# Patient Record
Sex: FEMALE | Race: WHITE | NOT HISPANIC OR LATINO | Employment: FULL TIME
[De-identification: names, ages, dates, MRNs, and addresses within clinical notes are randomized per-mention and may not be internally consistent; named-entity substitution may affect disease eponyms.]

---

## 2022-06-05 ENCOUNTER — HEALTH MAINTENANCE LETTER (OUTPATIENT)
Age: 27
End: 2022-06-05

## 2022-10-15 ENCOUNTER — HEALTH MAINTENANCE LETTER (OUTPATIENT)
Age: 27
End: 2022-10-15

## 2023-02-08 ENCOUNTER — OFFICE VISIT (OUTPATIENT)
Dept: FAMILY MEDICINE | Facility: CLINIC | Age: 28
End: 2023-02-08
Payer: COMMERCIAL

## 2023-02-08 VITALS
OXYGEN SATURATION: 97 % | SYSTOLIC BLOOD PRESSURE: 125 MMHG | TEMPERATURE: 98.3 F | HEIGHT: 67 IN | DIASTOLIC BLOOD PRESSURE: 85 MMHG | HEART RATE: 103 BPM | WEIGHT: 249.8 LBS | BODY MASS INDEX: 39.21 KG/M2

## 2023-02-08 DIAGNOSIS — Z31.69 ENCOUNTER FOR PRECONCEPTION CONSULTATION: ICD-10-CM

## 2023-02-08 DIAGNOSIS — Z00.00 ENCOUNTER FOR HEALTH MAINTENANCE EXAMINATION IN ADULT: Primary | ICD-10-CM

## 2023-02-08 PROBLEM — F41.8 DEPRESSION WITH ANXIETY: Status: ACTIVE | Noted: 2021-10-04

## 2023-02-08 LAB
ALBUMIN UR-MCNC: NEGATIVE MG/DL
APPEARANCE UR: CLEAR
BILIRUB UR QL STRIP: NEGATIVE
COLOR UR AUTO: YELLOW
GLUCOSE UR STRIP-MCNC: NEGATIVE MG/DL
HGB UR QL STRIP: NEGATIVE
KETONES UR STRIP-MCNC: NEGATIVE MG/DL
LEUKOCYTE ESTERASE UR QL STRIP: NEGATIVE
NITRATE UR QL: NEGATIVE
PH UR STRIP: 7 [PH] (ref 5–8)
SP GR UR STRIP: 1.01 (ref 1–1.03)
UROBILINOGEN UR STRIP-ACNC: 0.2 E.U./DL

## 2023-02-08 PROCEDURE — G0145 SCR C/V CYTO,THINLAYER,RESCR: HCPCS | Performed by: STUDENT IN AN ORGANIZED HEALTH CARE EDUCATION/TRAINING PROGRAM

## 2023-02-08 PROCEDURE — 86762 RUBELLA ANTIBODY: CPT | Performed by: STUDENT IN AN ORGANIZED HEALTH CARE EDUCATION/TRAINING PROGRAM

## 2023-02-08 PROCEDURE — 86803 HEPATITIS C AB TEST: CPT | Performed by: STUDENT IN AN ORGANIZED HEALTH CARE EDUCATION/TRAINING PROGRAM

## 2023-02-08 PROCEDURE — 99385 PREV VISIT NEW AGE 18-39: CPT | Performed by: STUDENT IN AN ORGANIZED HEALTH CARE EDUCATION/TRAINING PROGRAM

## 2023-02-08 PROCEDURE — 81003 URINALYSIS AUTO W/O SCOPE: CPT | Performed by: STUDENT IN AN ORGANIZED HEALTH CARE EDUCATION/TRAINING PROGRAM

## 2023-02-08 PROCEDURE — 36415 COLL VENOUS BLD VENIPUNCTURE: CPT | Performed by: STUDENT IN AN ORGANIZED HEALTH CARE EDUCATION/TRAINING PROGRAM

## 2023-02-08 PROCEDURE — 86787 VARICELLA-ZOSTER ANTIBODY: CPT | Performed by: STUDENT IN AN ORGANIZED HEALTH CARE EDUCATION/TRAINING PROGRAM

## 2023-02-08 RX ORDER — NORETHINDRONE ACETATE AND ETHINYL ESTRADIOL 1MG-20(21)
1 KIT ORAL DAILY
COMMUNITY
End: 2023-10-26

## 2023-02-08 RX ORDER — NORETHINDRONE ACETATE AND ETHINYL ESTRADIOL 1MG-20(21)
1 KIT ORAL EVERY MORNING
COMMUNITY
Start: 2022-11-14 | End: 2023-02-08

## 2023-02-08 RX ORDER — NORETHINDRONE ACETATE AND ETHINYL ESTRADIOL 1MG-20(21)
1 KIT ORAL EVERY MORNING
Qty: 84 TABLET | Refills: 3 | Status: SHIPPED | OUTPATIENT
Start: 2023-02-08 | End: 2023-10-26

## 2023-02-08 RX ORDER — FLUTICASONE PROPIONATE 50 MCG
1 SPRAY, SUSPENSION (ML) NASAL DAILY
COMMUNITY

## 2023-02-08 RX ORDER — LORATADINE 10 MG/1
10 TABLET ORAL DAILY
COMMUNITY

## 2023-02-08 RX ORDER — FLUOXETINE 10 MG/1
30 CAPSULE ORAL
COMMUNITY
Start: 2021-09-21 | End: 2023-10-26

## 2023-02-08 ASSESSMENT — ENCOUNTER SYMPTOMS
NERVOUS/ANXIOUS: 1
COUGH: 0
WEAKNESS: 0
DYSURIA: 0
CONSTIPATION: 0
ARTHRALGIAS: 0
BREAST MASS: 0
CHILLS: 0
FEVER: 0
FREQUENCY: 0
PALPITATIONS: 0
NAUSEA: 0
HEADACHES: 0
HEMATURIA: 0
SHORTNESS OF BREATH: 0
EYE PAIN: 0
JOINT SWELLING: 0
DIZZINESS: 0
SORE THROAT: 0
ABDOMINAL PAIN: 0
DIARRHEA: 0
HEARTBURN: 0
PARESTHESIAS: 0
HEMATOCHEZIA: 0
MYALGIAS: 0

## 2023-02-08 ASSESSMENT — PATIENT HEALTH QUESTIONNAIRE - PHQ9
SUM OF ALL RESPONSES TO PHQ QUESTIONS 1-9: 16
SUM OF ALL RESPONSES TO PHQ QUESTIONS 1-9: 16
10. IF YOU CHECKED OFF ANY PROBLEMS, HOW DIFFICULT HAVE THESE PROBLEMS MADE IT FOR YOU TO DO YOUR WORK, TAKE CARE OF THINGS AT HOME, OR GET ALONG WITH OTHER PEOPLE: SOMEWHAT DIFFICULT

## 2023-02-08 NOTE — PROGRESS NOTES
SUBJECTIVE:   CC: Christi is an 27 year old who presents for preventive health visit.       Healthy Habits:     Getting at least 3 servings of Calcium per day:  Yes    Bi-annual eye exam:  NO    Dental care twice a year:  Yes    Sleep apnea or symptoms of sleep apnea:  Daytime drowsiness and Sleep apnea    Diet:  Regular (no restrictions)    Frequency of exercise:  2-3 days/week    Duration of exercise:  Greater than 60 minutes    Taking medications regularly:  No    Barriers to taking medications:  Problems remembering to take them    Medication side effects:  None    PHQ-2 Total Score: 4    Additional concerns today:  Yes  UTI  Pertinent negatives include no abdominal pain, arthralgias, chest pain, chills, congestion, coughing, fever, headaches, joint swelling, myalgias, nausea, rash, sore throat or weakness.     ETOH use - was binging 2w ago, cutting back     ++++++++++++++++++++++++  Exercise: doesn't do anything strenuous or weight. Walks 120 minutes/week.     Food: mainly eats at home, did previously eat a lot of packaged foods but since Wilmer been working on diet - a lot more vegetables, a lot less processed foods     Today's PHQ-2 Score:   PHQ-2 ( 1999 [employer]) 2/8/2023   Q1: Little interest or pleasure in doing things 2   Q2: Feeling down, depressed or hopeless 2   PHQ-2 Score 4   Q1: Little interest or pleasure in doing things More than half the days   Q2: Feeling down, depressed or hopeless More than half the days   PHQ-2 Score 4         Social History     Tobacco Use     Smoking status: Never     Smokeless tobacco: Never   Substance Use Topics     Alcohol use: Yes     If you drink alcohol do you typically have >3 drinks per day or >7 drinks per week? No    Alcohol Use 2/8/2023   Prescreen: >3 drinks/day or >7 drinks/week? No   No flowsheet data found.    Reviewed orders with patient.  Reviewed health maintenance and updated orders accordingly - Yes  Lab work is in process    Breast Cancer  "Screening:    FHS-7: No flowsheet data found.  click delete button to remove this line now  Patient under 40 years of age: Routine Mammogram Screening not recommended.   Pertinent mammograms are reviewed under the imaging tab.    Repro hx:   Has been on OCPs past 3 years. Skips some placebo weeks -- maybe gets it twice a year   Periods before that were relatively mild. Cramps were bad, bleeding wasn't super heavy. Would get monthly periods.     History of abnormal Pap smear: NO - age 21-29 PAP every 3 years recommended  Last pap June 2019      Reviewed and updated as needed this visit by clinical staff    Allergies  Meds  Problems  Med Hx  Surg Hx           Reviewed and updated as needed this visit by Provider    Allergies  Meds  Problems  Med Hx  Surg Hx              Review of Systems   Constitutional: Negative for chills and fever.   HENT: Negative for congestion, ear pain, hearing loss and sore throat.    Eyes: Negative for pain and visual disturbance.   Respiratory: Negative for cough and shortness of breath.    Cardiovascular: Negative for chest pain, palpitations and peripheral edema.   Gastrointestinal: Negative for abdominal pain, constipation, diarrhea, heartburn, hematochezia and nausea.   Breasts:  Negative for tenderness, breast mass and discharge.   Genitourinary: Negative for dysuria, frequency, genital sores, hematuria, pelvic pain, urgency, vaginal bleeding and vaginal discharge.   Musculoskeletal: Negative for arthralgias, joint swelling and myalgias.   Skin: Negative for rash.   Neurological: Negative for dizziness, weakness, headaches and paresthesias.   Psychiatric/Behavioral: Negative for mood changes. The patient is nervous/anxious.           OBJECTIVE:   /85   Pulse 103   Temp 98.3  F (36.8  C) (Oral)   Ht 1.714 m (5' 7.48\")   Wt 113.3 kg (249 lb 12.8 oz)   LMP 01/25/2023 (Exact Date)   SpO2 97%   BMI 38.57 kg/m    Physical Exam  GENERAL: healthy, alert and no " "distress  EYES: Eyes grossly normal to inspection, PERRL and conjunctivae and sclerae normal  HENT: ear canals and TM's normal, nose and mouth without ulcers or lesions  NECK: no adenopathy, no asymmetry, masses, or scars and thyroid normal to palpation  RESP: lungs clear to auscultation - no rales, rhonchi or wheezes  CV: regular rate and rhythm, normal S1 S2, no S3 or S4, no murmur, click or rub   MS: no gross musculoskeletal defects noted, no edema  SKIN: no suspicious lesions or rashes  NEURO: Normal strength and tone, mentation intact and speech normal  PSYCH: mentation appears normal, affect normal/bright       ASSESSMENT/PLAN:   Christi was seen today for physical and uti.    Diagnoses and all orders for this visit:    Encounter for health maintenance examination in adult  Encounter for preconception consultation  Pap updated, OCPs refilled, commended on healthy lifestyle changes. Will complete preconception infection screening as a part of routine labs. Discussed conception generally, folic acid supplement once off OCPs, RTC as needed with questions.   -     norethindrone-ethinyl estradiol (JUNEL FE 1/20) 1-20 MG-MCG tablet; Take 1 tablet by mouth every morning  -     UA reflex to Microscopic and Culture; Future  -     Rubella Antibody IgG; Future  -     Varicella Zoster Virus Antibody IgG; Future  -     Hepatitis C Screen Reflex to HCV RNA Quant and Genotype; Future  -     Pap screen reflex to HPV if ASCUS - recommend age 25 - 29  -     Rubella Antibody IgG  -     Varicella Zoster Virus Antibody IgG  -     Hepatitis C Screen Reflex to HCV RNA Quant and Genotype  -     UA reflex to Microscopic and Culture  -     Cancel: HPV Hold (Lab Only)                COUNSELING:  Reviewed preventive health counseling, as reflected in patient instructions      BMI:   Estimated body mass index is 38.57 kg/m  as calculated from the following:    Height as of this encounter: 1.714 m (5' 7.48\").    Weight as of this encounter: " 113.3 kg (249 lb 12.8 oz).   Weight management plan: Discussed healthy diet and exercise guidelines      She reports that she has never smoked. She has never used smokeless tobacco.    Edith Ferrera DO  [facility]  Answers for HPI/ROS submitted by the patient on 2/8/2023  If you checked off any problems, how difficult have these problems made it for you to do your work, take care of things at home, or get along with other people?: Somewhat difficult  PHQ9 TOTAL SCORE: 16

## 2023-02-09 LAB
HCV AB SERPL QL IA: NONREACTIVE
RUBV IGG SERPL QL IA: 1.77 INDEX
RUBV IGG SERPL QL IA: POSITIVE
VZV IGG SER QL IA: 36.9 INDEX
VZV IGG SER QL IA: NORMAL

## 2023-02-13 LAB
BKR LAB AP GYN ADEQUACY: NORMAL
BKR LAB AP GYN INTERPRETATION: NORMAL
BKR LAB AP HPV REFLEX: NORMAL
BKR LAB AP LMP: NORMAL
BKR LAB AP PREVIOUS ABNORMAL: NORMAL
PATH REPORT.COMMENTS IMP SPEC: NORMAL
PATH REPORT.COMMENTS IMP SPEC: NORMAL
PATH REPORT.RELEVANT HX SPEC: NORMAL

## 2023-03-21 ENCOUNTER — ALLIED HEALTH/NURSE VISIT (OUTPATIENT)
Dept: FAMILY MEDICINE | Facility: CLINIC | Age: 28
End: 2023-03-21
Payer: COMMERCIAL

## 2023-03-21 DIAGNOSIS — Z23 ENCOUNTER FOR IMMUNIZATION: Primary | ICD-10-CM

## 2023-03-21 PROCEDURE — 90471 IMMUNIZATION ADMIN: CPT

## 2023-03-21 PROCEDURE — 90716 VAR VACCINE LIVE SUBQ: CPT

## 2023-03-21 PROCEDURE — 99207 PR NO CHARGE NURSE ONLY: CPT

## 2023-03-21 RX ORDER — TRAZODONE HYDROCHLORIDE 50 MG/1
TABLET, FILM COATED ORAL
COMMUNITY
Start: 2023-03-15 | End: 2023-10-26

## 2023-03-21 RX ORDER — HYDROXYZINE HYDROCHLORIDE 25 MG/1
TABLET, FILM COATED ORAL
COMMUNITY
Start: 2023-03-16 | End: 2024-07-01

## 2023-03-21 RX ORDER — FLUOXETINE 40 MG/1
CAPSULE ORAL
COMMUNITY
Start: 2023-02-22 | End: 2023-10-26

## 2023-03-21 NOTE — PROGRESS NOTES
Patient here for second varicella vaccine, first in 2007. Patient wanting to get pregnant this year so she wanted to be immunized. Titers were drawn per patient report and she is not immune.    Vaccine given in the right deltoid, patient tolderated well.    Martha Chao RN

## 2023-10-26 ENCOUNTER — VIRTUAL VISIT (OUTPATIENT)
Dept: OBGYN | Facility: CLINIC | Age: 28
End: 2023-10-26
Payer: COMMERCIAL

## 2023-10-26 VITALS — BODY MASS INDEX: 38.55 KG/M2 | HEIGHT: 68 IN

## 2023-10-26 DIAGNOSIS — Z34.00 ENCOUNTER FOR SUPERVISION OF NORMAL FIRST PREGNANCY: ICD-10-CM

## 2023-10-26 DIAGNOSIS — Z23 NEED FOR TDAP VACCINATION: Primary | ICD-10-CM

## 2023-10-26 PROCEDURE — 99207 PR NO CHARGE NURSE ONLY: CPT

## 2023-10-26 RX ORDER — VITAMIN B COMPLEX
TABLET ORAL DAILY
COMMUNITY

## 2023-10-26 RX ORDER — MULTIVIT-MIN/IRON/FOLIC ACID/K 18-600-40
CAPSULE ORAL
COMMUNITY
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
## 2023-11-08 ENCOUNTER — VIRTUAL VISIT (OUTPATIENT)
Dept: EDUCATION SERVICES | Facility: CLINIC | Age: 28
End: 2023-11-08
Attending: OBSTETRICS & GYNECOLOGY
Payer: COMMERCIAL

## 2023-11-08 DIAGNOSIS — Z34.00 ENCOUNTER FOR SUPERVISION OF NORMAL FIRST PREGNANCY: ICD-10-CM

## 2023-11-08 PROCEDURE — 97802 MEDICAL NUTRITION INDIV IN: CPT | Performed by: DIETITIAN, REGISTERED

## 2023-11-08 NOTE — LETTER
11/8/2023         RE: Christi Barrientos  [address]  [facility] [address]        Dear Colleague,    Thank you for referring your patient, Christi Barrientos, to the [facility] DIABETES EDUCATION. Please see a copy of my visit note below.    MEDICAL NUTRITION THERAPY  Visit Type:Initial assessment and intervention  Type of Service: Telephone Visit    Originating Location (Patient Location): Home  Distant Location (Provider Location): Offsite  Mode of Communication:  Telephone  Telephone Visit Start Time:  106  Telephone Visit End Time (telephone visit stop time): 126    SUBJECTIVE:   Christi Barrientos presents today for MNT and education related to healthy normal nutrition .   She is accompanied by self.   PATIENT STATED GOAL(S) FOR THIS VISIT: general healthy nutrition while pregnant     EATING HABITS:   Know that weight gain recommendations are around 11-20 # and is concerned for potential for more with cravings.  Wants to start off well and focus on balance and overall good nutrition.   Worried about binge eating and gaining weight.  Since finding out abotu being pregnant has been eating adequate calories for maintenance / normal nutrition; had previously been trying to lose weight and calories were lower at 1600.  Brougth it up to 2000 but still feels hungry; listening to body and not tracking that closely.  2000cals is below estimates and ok wto add more focusing on healhty sources ofr weight maintenance during first trimester.     Breakfast - toast and eggs.     Lunch: leftover / soup and sandwiches / aisan foods, rice / meat   Dinner - avoiding meat with being turned off by it.  Aisan foods, tiki marsala, stirfryes, hot and sour soup   Zero interest in meat since being pregnant,   Morning sickness around dinner   3 main meals   Snacks - craving fruit right now, pear and apple   Family / birthday season  - cakes / brothers brining treats.   Multigrain could try, likes white    Peanut butter  - not a big fan   Dairy is OK  - plain yogurt   Fairlife milk  for added protein   Eggs - OK   Veggies - no issues with those   Avocados - could try again, liked pre pregnancy   240#       EXERCISE: not assessed  SOCIO/ECONOMIC:   Lives with: self and spouse    OBJECTIVE:   Vitals: LMP 09/11/2023     Wt Readings from Last 5 Encounters:   02/08/23 113.3 kg (249 lb 12.8 oz)         ASSESSMENT:     VERBAL AND WRITTEN INFORMATION GIVEN TO SUPPORT:  Discussed: general nutrition guidelines, consistent meals, labeling, dining out/special occasions, fiber, portion control, and adequate macronutrients and balance of foods.    PLAN:   PATIENT'S BEHAVIOR CHANGE GOALS:   See Patient Instructions for patient stated behavior change goals. AVS was printed and given to patient at today's appointment.    FOLLOW UP:   Follow up with RD as needed.  Call RD with questions/concerns.     Julia Hawthorne RD, LD, [facility]  Diabetes Education    Time spent in minutes: 15mnt  Encounter: Individual

## 2023-11-08 NOTE — PROGRESS NOTES
MEDICAL NUTRITION THERAPY  Visit Type:Initial assessment and intervention  Type of Service: Telephone Visit    Originating Location (Patient Location): Home  Distant Location (Provider Location): Offsite  Mode of Communication:  Telephone  Telephone Visit Start Time:  106  Telephone Visit End Time (telephone visit stop time): 126    SUBJECTIVE:   Christi Barrientos presents today for MNT and education related to healthy normal nutrition .   She is accompanied by self.   PATIENT STATED GOAL(S) FOR THIS VISIT: general healthy nutrition while pregnant     EATING HABITS:   Know that weight gain recommendations are around 11-20 # and is concerned for potential for more with cravings.  Wants to start off well and focus on balance and overall good nutrition.   Worried about binge eating and gaining weight.  Since finding out abotu being pregnant has been eating adequate calories for maintenance / normal nutrition; had previously been trying to lose weight and calories were lower at 1600.  Brougth it up to 2000 but still feels hungry; listening to body and not tracking that closely.  2000cals is below estimates and ok wto add more focusing on healhty sources ofr weight maintenance during first trimester.     Breakfast - toast and eggs.     Lunch: leftover / soup and sandwiches / aisan foods, rice / meat   Dinner - avoiding meat with being turned off by it.  Aisan foods, tiki marsala, stirfryes, hot and sour soup   Zero interest in meat since being pregnant,   Morning sickness around dinner   3 main meals   Snacks - craving fruit right now, pear and apple   Family / birthday season  - cakes / brothers brining treats.   Multigrain could try, likes white   Peanut butter  - not a big fan   Dairy is OK  - plain yogurt   Fairlife milk  for added protein   Eggs - OK   Veggies - no issues with those   Avocados - could try again, liked pre pregnancy   240#       EXERCISE: not assessed  SOCIO/ECONOMIC:   Lives with: self and  spouse    OBJECTIVE:   Vitals: LMP 09/11/2023     Wt Readings from Last 5 Encounters:   02/08/23 113.3 kg (249 lb 12.8 oz)         ASSESSMENT:     VERBAL AND WRITTEN INFORMATION GIVEN TO SUPPORT:  Discussed: general nutrition guidelines, consistent meals, labeling, dining out/special occasions, fiber, portion control, and adequate macronutrients and balance of foods.    PLAN:   PATIENT'S BEHAVIOR CHANGE GOALS:   See Patient Instructions for patient stated behavior change goals. AVS was printed and given to patient at today's appointment.    FOLLOW UP:   Follow up with RD as needed.  Call RD with questions/concerns.     Julia Hawthorne RD, LD, [facility]  Diabetes Education    Time spent in minutes: 15mnt  Encounter: Individual

## 2023-11-26 LAB
ABO/RH(D): NORMAL
ANTIBODY SCREEN: NEGATIVE
SPECIMEN EXPIRATION DATE: NORMAL

## 2023-11-27 ENCOUNTER — ANCILLARY PROCEDURE (OUTPATIENT)
Dept: ULTRASOUND IMAGING | Facility: CLINIC | Age: 28
End: 2023-11-27
Payer: COMMERCIAL

## 2023-11-27 ENCOUNTER — TRANSCRIBE ORDERS (OUTPATIENT)
Dept: MATERNAL FETAL MEDICINE | Facility: CLINIC | Age: 28
End: 2023-11-27

## 2023-11-27 ENCOUNTER — PRENATAL OFFICE VISIT (OUTPATIENT)
Dept: OBGYN | Facility: CLINIC | Age: 28
End: 2023-11-27
Payer: COMMERCIAL

## 2023-11-27 VITALS
DIASTOLIC BLOOD PRESSURE: 75 MMHG | BODY MASS INDEX: 38.27 KG/M2 | OXYGEN SATURATION: 99 % | HEART RATE: 107 BPM | SYSTOLIC BLOOD PRESSURE: 114 MMHG | WEIGHT: 248 LBS

## 2023-11-27 DIAGNOSIS — E66.812 CLASS 2 OBESITY WITHOUT SERIOUS COMORBIDITY WITH BODY MASS INDEX (BMI) OF 38.0 TO 38.9 IN ADULT, UNSPECIFIED OBESITY TYPE: ICD-10-CM

## 2023-11-27 DIAGNOSIS — Z34.01 ENCOUNTER FOR SUPERVISION OF NORMAL FIRST PREGNANCY IN FIRST TRIMESTER: Primary | ICD-10-CM

## 2023-11-27 DIAGNOSIS — O26.90 PREGNANCY RELATED CONDITION, ANTEPARTUM: Primary | ICD-10-CM

## 2023-11-27 DIAGNOSIS — Z34.00 ENCOUNTER FOR SUPERVISION OF NORMAL FIRST PREGNANCY: ICD-10-CM

## 2023-11-27 DIAGNOSIS — Z88.0 PENICILLIN ALLERGY: ICD-10-CM

## 2023-11-27 LAB
ALBUMIN UR-MCNC: NEGATIVE MG/DL
APPEARANCE UR: CLEAR
BILIRUB UR QL STRIP: NEGATIVE
COLOR UR AUTO: YELLOW
ERYTHROCYTE [DISTWIDTH] IN BLOOD BY AUTOMATED COUNT: 14.5 % (ref 10–15)
GLUCOSE UR STRIP-MCNC: NEGATIVE MG/DL
HBA1C MFR BLD: 5.7 % (ref 0–5.6)
HCT VFR BLD AUTO: 38.8 % (ref 35–47)
HGB BLD-MCNC: 12.7 G/DL (ref 11.7–15.7)
HGB UR QL STRIP: NEGATIVE
KETONES UR STRIP-MCNC: NEGATIVE MG/DL
LEUKOCYTE ESTERASE UR QL STRIP: ABNORMAL
MCH RBC QN AUTO: 26.3 PG (ref 26.5–33)
MCHC RBC AUTO-ENTMCNC: 32.7 G/DL (ref 31.5–36.5)
MCV RBC AUTO: 81 FL (ref 78–100)
NITRATE UR QL: NEGATIVE
PH UR STRIP: 5.5 [PH] (ref 5–7)
PLATELET # BLD AUTO: 361 10E3/UL (ref 150–450)
RBC # BLD AUTO: 4.82 10E6/UL (ref 3.8–5.2)
SP GR UR STRIP: <=1.005 (ref 1–1.03)
UROBILINOGEN UR STRIP-ACNC: 0.2 E.U./DL
WBC # BLD AUTO: 12.9 10E3/UL (ref 4–11)

## 2023-11-27 PROCEDURE — 81003 URINALYSIS AUTO W/O SCOPE: CPT | Performed by: OBSTETRICS & GYNECOLOGY

## 2023-11-27 PROCEDURE — 86901 BLOOD TYPING SEROLOGIC RH(D): CPT | Performed by: OBSTETRICS & GYNECOLOGY

## 2023-11-27 PROCEDURE — 87389 HIV-1 AG W/HIV-1&-2 AB AG IA: CPT | Performed by: OBSTETRICS & GYNECOLOGY

## 2023-11-27 PROCEDURE — 86803 HEPATITIS C AB TEST: CPT | Performed by: OBSTETRICS & GYNECOLOGY

## 2023-11-27 PROCEDURE — 99203 OFFICE O/P NEW LOW 30 MIN: CPT | Mod: 25 | Performed by: OBSTETRICS & GYNECOLOGY

## 2023-11-27 PROCEDURE — 86850 RBC ANTIBODY SCREEN: CPT | Performed by: OBSTETRICS & GYNECOLOGY

## 2023-11-27 PROCEDURE — 36415 COLL VENOUS BLD VENIPUNCTURE: CPT | Performed by: OBSTETRICS & GYNECOLOGY

## 2023-11-27 PROCEDURE — 86900 BLOOD TYPING SEROLOGIC ABO: CPT | Performed by: OBSTETRICS & GYNECOLOGY

## 2023-11-27 PROCEDURE — 87086 URINE CULTURE/COLONY COUNT: CPT | Performed by: OBSTETRICS & GYNECOLOGY

## 2023-11-27 PROCEDURE — 86762 RUBELLA ANTIBODY: CPT | Performed by: OBSTETRICS & GYNECOLOGY

## 2023-11-27 PROCEDURE — 87340 HEPATITIS B SURFACE AG IA: CPT | Performed by: OBSTETRICS & GYNECOLOGY

## 2023-11-27 PROCEDURE — 86780 TREPONEMA PALLIDUM: CPT | Performed by: OBSTETRICS & GYNECOLOGY

## 2023-11-27 PROCEDURE — 83036 HEMOGLOBIN GLYCOSYLATED A1C: CPT | Performed by: OBSTETRICS & GYNECOLOGY

## 2023-11-27 PROCEDURE — 85027 COMPLETE CBC AUTOMATED: CPT | Performed by: OBSTETRICS & GYNECOLOGY

## 2023-11-27 PROCEDURE — 76801 OB US < 14 WKS SINGLE FETUS: CPT | Performed by: OBSTETRICS & GYNECOLOGY

## 2023-11-27 NOTE — PROGRESS NOTES
OB - New OB History and Physical    HPI: Christi Barrientos is a 28 year old  at 11w0d as dated by LMP c/w 11w US.   Estimated Date of Delivery: 2024.    This was a planned pregnancy.  FOB Moo involved and  supportive.    Started trying in .  Had been on birth control for about 3 years before then.      Since becoming pregnant, patient reports she's been feeling nauseated, but eating crackers in the morning helps.  No spotting or cramping.    Recently had nutrition consult per her request.  Had been trying to lose weight, but wanted to ensure doing all the right things for pregnancy.    Hx of penicillin allergy since childhood.  Unsure of reaction, but maybe a rash or hives?    Ultrasound: today showed single live IUP measuring 10w3d, consistent with LMP dating.    Obstetric history:     OB History    Para Term  AB Living   1 0 0 0 0 0   SAB IAB Ectopic Multiple Live Births   0 0 0 0 0      # Outcome Date GA Lbr Jose/2nd Weight Sex Delivery Anes PTL Lv   1 Current                Gynecologic History:   Menstrual Interval: regular, monthly  Patient's last menstrual period was 2023.   STI history: neg  Last Pap: 23  History of abnormal pap: neg    Allergy: Penicillins  Patient denies food, latex or environmental allergies.     Current Medications:  Current Outpatient Medications   Medication    Ascorbic Acid (VITAMIN C) 500 MG CAPS    fluticasone (FLONASE) 50 MCG/ACT nasal spray    hydrOXYzine (ATARAX) 25 MG tablet    loratadine (CLARITIN) 10 MG tablet    Prenatal Vit-DSS-Fe Cbn-FA (PRENATAL AD PO)    sertraline (ZOLOFT) 50 MG tablet    Vitamin D3 (CHOLECALCIFEROL) 25 mcg (1000 units) tablet     No current facility-administered medications for this visit.       Past Medical History:  No past medical history on file.    Past Surgical History:  Past Surgical History:   Procedure Laterality Date    TONSILLECTOMY ADULT  2020       Social History:  Patient lives with ,  cat.  Patient's relationship status is: .    Works in IT at [employer].   Denies current tobacco, alcohol or recreational drug use.   She feels safe in her relationship. Patient denies history of sexual, physical or mental abuse.     Family History:  Family History   Problem Relation Age of Onset    Leukemia Mother 41    Diabetes Type 2  Paternal Grandmother     Diabetes Type 2  Paternal Grandfather     No Known Problems Brother     No Known Problems Brother     No Known Problems Brother     No Known Problems Sister     Diabetes Type 2  Paternal Aunt     Diabetes Paternal Uncle     Diabetes Type 2  Paternal Uncle        Review of Systems  Gen:  no change in weight, no fever, no chills, no fatigue  CV: no palpitations, no chest pain, no hypertension, no syncope  Resp: no shortness of breath, no cough, no wheezing, no asthma  GI: + nausea, no vomiting, no diarrhea, no constipation, no bloating, no GERD  :  no vaginal discharge, no dysuria, no abnormal bleeding, no pelvic pain   Endo: no thyroid problems, no cold/heat intolerance, no acne, no hirsutism, no diabetes  Heme: no easy bruising or bleeding, no history of DVT/PE/CVA  Neuro: no headaches, no seizures, no strokes, no focal deficits      Physical Exam:  Vitals:    23 1149   BP: 114/75   Pulse: 107   SpO2: 99%   Weight: 112.5 kg (248 lb)     Body mass index is 38.27 kg/m .  Gen: alert, oriented, no distress,  pleasant, appears stated age, appropriately groomed  Neck: supple, trachea midline, no thyromegaly, no lymphadenopathy  HEENT: head normocephalic, atraumatic, normal oropharynx without erythema or exudates  CV: normal heart sounds, regular rate and rhythm, no murmurs  Resp: good inspiratory effort, lungs clear to ascultation bilaterally, no wheezes or rhonchi  Extr: warm, well perfused, nontender, no edema  Psych: affect bright, cooperative, responds appropriately      Assessment:  Christi Barrientos is a 28 year old  at 11w0d presenting to  establish prenatal care.    Problem List:   Obesity, BMI 38.  MFM for Level 2  Penicillin allergy:  allergy referral for testing      Plan:  Reviewed routine prenatal care. Discussed MD call schedule as well as role of residents and med students both in clinic and hospital.  She is okay with resident care  Pap: UTD   Diet, Nutrition and Exercise:  Continue PNVs. Continue normal exercise. Her prepregnancy BMI is 37.  According to the WHO guidelines, patient is given a goal of gaining approximately 11-20 pounds during the course of her pregnancy.    Immunizations: plan TdaP at 28 weeks  Fetal anomaly screening: discussed, interested in NIPT.  Referral placed.  Routine Prenatal Care: the patient will return to clinic in 4 weeks and prn    Camila Kim MD

## 2023-11-28 LAB
HBV SURFACE AG SERPL QL IA: NONREACTIVE
HCV AB SERPL QL IA: NONREACTIVE
HIV 1+2 AB+HIV1 P24 AG SERPL QL IA: NONREACTIVE
RUBV IGG SERPL QL IA: 2.08 INDEX
RUBV IGG SERPL QL IA: POSITIVE
T PALLIDUM AB SER QL: NONREACTIVE

## 2023-11-29 LAB — BACTERIA UR CULT: NORMAL

## 2023-12-01 ENCOUNTER — PRE VISIT (OUTPATIENT)
Dept: MATERNAL FETAL MEDICINE | Facility: CLINIC | Age: 28
End: 2023-12-01
Payer: COMMERCIAL

## 2023-12-07 ENCOUNTER — OFFICE VISIT (OUTPATIENT)
Dept: MATERNAL FETAL MEDICINE | Facility: CLINIC | Age: 28
End: 2023-12-07
Attending: STUDENT IN AN ORGANIZED HEALTH CARE EDUCATION/TRAINING PROGRAM
Payer: COMMERCIAL

## 2023-12-07 ENCOUNTER — LAB (OUTPATIENT)
Dept: LAB | Facility: CLINIC | Age: 28
End: 2023-12-07
Attending: STUDENT IN AN ORGANIZED HEALTH CARE EDUCATION/TRAINING PROGRAM
Payer: COMMERCIAL

## 2023-12-07 ENCOUNTER — HOSPITAL ENCOUNTER (OUTPATIENT)
Dept: ULTRASOUND IMAGING | Facility: CLINIC | Age: 28
Discharge: HOME | End: 2023-12-07
Attending: STUDENT IN AN ORGANIZED HEALTH CARE EDUCATION/TRAINING PROGRAM
Payer: COMMERCIAL

## 2023-12-07 ENCOUNTER — MEDICAL CORRESPONDENCE (OUTPATIENT)
Dept: HEALTH INFORMATION MANAGEMENT | Facility: CLINIC | Age: 28
End: 2023-12-07

## 2023-12-07 DIAGNOSIS — O26.90 PREGNANCY RELATED CONDITION, ANTEPARTUM: ICD-10-CM

## 2023-12-07 DIAGNOSIS — O26.90 PREGNANCY RELATED CONDITION, ANTEPARTUM: Primary | ICD-10-CM

## 2023-12-07 DIAGNOSIS — Z36.82 NUCHAL TRANSLUCENCY OF FETUS ON PRENATAL ULTRASOUND: Primary | ICD-10-CM

## 2023-12-07 DIAGNOSIS — Z31.5 ENCOUNTER FOR PROCREATIVE GENETIC COUNSELING AND TESTING: ICD-10-CM

## 2023-12-07 DIAGNOSIS — Z31.430 ENCOUNTER OF FEMALE FOR TESTING FOR GENETIC DISEASE CARRIER STATUS FOR PROCREATIVE MANAGEMENT: ICD-10-CM

## 2023-12-07 DIAGNOSIS — O26.90 PREGNANCY RELATED CONDITION: Primary | ICD-10-CM

## 2023-12-07 DIAGNOSIS — Z31.9 UNSPECIFIED PROCREATIVE MANAGEMENT: ICD-10-CM

## 2023-12-07 PROCEDURE — 36415 COLL VENOUS BLD VENIPUNCTURE: CPT

## 2023-12-07 PROCEDURE — 76813 OB US NUCHAL MEAS 1 GEST: CPT | Mod: 26 | Performed by: STUDENT IN AN ORGANIZED HEALTH CARE EDUCATION/TRAINING PROGRAM

## 2023-12-07 PROCEDURE — 96040 HC GENETIC COUNSELING, EACH 30 MINUTES: CPT

## 2023-12-07 PROCEDURE — 76813 OB US NUCHAL MEAS 1 GEST: CPT

## 2023-12-07 NOTE — NURSING NOTE
Patient reports no pain, no contractions, leaking of fluid, or bleeding.  SBAR given to DOMINICK CLARK, see their note in Epic.

## 2023-12-07 NOTE — PROGRESS NOTES
[facility]  Genetic Counseling Consult    Patient:  Christi Barrientos YOB: 1995   Date of Service:  12/07/23   MRN: [id number]    Christi was seen at the [facility] for genetic consultation. The indication for genetic counseling is desire to discuss options for genetic screening and diagnostics. The patient was accompanied to this visit by their , Moo.    The session was conducted in English.        IMPRESSION/ PLAN   1. Christi has not had genetic screening in this pregnancy but elected to have screening today.     2. During today's [facility] visit, Christi had a blood draw for expanded non-invasive prenatal screening (NIPS) through [facility]. The expanded NIPS screens for trisomy 21, 18, and 13 and 22q11.2 deletion syndrome. The patient opted to screen for sex chromosome aneuploidies, including reported fetal sex.  In accordance with current guidelines, other microdeletion syndromes and rare autosomal trisomies were not included, but reflex to include these conditions remains available with expanded NIPS if there is an indication later in the pregnancy. Results are expected in 5-10 days. The patient will be called with results and if they do not answer they requested a detailed message with results on their voicemail, including the predicted fetal sex information. Christi was informed that results, including fetal sex, will be available in [employer].    3. Christi and Moo elected to pursue expanded carrier screening through [facility]. Results are expected in 2-3 weeks. I will call Christi when both of their results are back. Christi provided verbal consent to leave results in her voicemail. Consent to communicate forms were signed today. Results will also be available in [employer].    4. Christi had a first trimester complete ultrasound today. Please see the ultrasound report for further details.     5. Further  "recommendations include a fetal anatomy level II ultrasound with MFM. The upcoming ultrasound has been scheduled for 01/15/2024.    PREGNANCY HISTORY   /Parity:     No bleeding, illnesses, or exposures of concern were shared at today's visit. Christi's pregnancy history is non-contributory.    CURRENT PREGNANCY   Current Age: 28 year old   Age at Delivery: 28 year old  JASWANT: 2024, by Last Menstrual Period                                   Gestational Age: 12w3d  This pregnancy is a single gestation.   This pregnancy was conceived spontaneously.    MEDICAL HISTORY   Christi s reported medical history is not expected to impact pregnancy management or risks to fetal development.       FAMILY HISTORY   A three-generation family history was obtained today and is scanned under the \"Media\" tab in Epic. The family history was reported by Christi and Moo.    The following significant findings were reported today:   Both Christi and Moo have histories of anxiety and depression. Moo also has a history of substance use disorder. Moo is currently 36.  Mental health conditions are thought to be inherited in a multifactorial fashion, meaning many factors are involved in the development of these conditions. These factors usually include both genetic and environmental aspects and a combination of these aspects lead to symptoms. Given that there is a genetic component, the couple's children may be at increased risk to develop a mental health condition and were encouraged to share this information with their pediatrician and have awareness for early signs and symptoms.    Christi's mother passed away from leukemia shortly after she was diagnosed at 41. There is no other cancer history in Christi's mother's family. Christi's paternal grandmother  from cancer in her 40s, Christi believes it was uterine or cervical cancer. Some of Christi's father's siblings have had cancer but Christi was unsure about details.  We briefly " discussed the family history of cancer. Cancer most often occurs by chance, however some families seem to develop cancer more frequently than expected. Everyone has a risk to develop cancer, but individuals may be at an increased risk to develop cancer based on their family history. We discussed that early onset cancers can be associated with inherited cancer predisposition syndromes. Genetic counseling is available for cancer syndromes. Cancer family history, even without genetic testing, can change cancer screening recommendations for family members and aid in insurance coverage for access to them as well. The most informative individuals to complete cancer genetic counseling and genetic testing are those with a personal history of cancer or those closely related to the affected individuals. If the family wants more information they can contact the [facility] Cancer Risk Management Program ([phone]).   Moo's mother has a history of colon polyps (10s). Moo's brother and sister also have histories of polyps (10s or fewer). One of Moo's mother's siblings has a history of diverticulitis. Moo's last colonoscopy was at 35-36 years of age and he did not have any polyps.  We reviewed that there are some hereditary cancer predisposition syndromes that cause a high polyp burden. We discussed that it is important for Moo and his relatives to continue receiving colonoscopies as directed by their providers. Additionally, if any diagnoses of cancer are made or if the family would like to discuss this history further, they can reach out to the [facility] Cancer Risk Management Program (phone number above).  Moo's maternal grandfather passed away from a heart attack in his 40s.  Cardiac concerns can be related to many factors. There are genetic conditions that cause cardiac complications that could lead to heart attacks. Without additional information relating to this family history, it is difficult to  provide an accurate risk assessment.   Moo has a paternal first cousin who has autism spectrum disorder.  Some forms of autism spectrum disorders can be associated with specific genetic conditions, approximately 15-30% of individuals who have autism will have an identifiable genetic cause for this history. However, most often these conditions are due to the combination of genes and environmental factors that can affect development. Since there is a genetic component to autism spectrum disorders, the recurrence risk for other family members is higher among first-degree relatives of the individual with an autism spectrum disorder (full siblings), and decreases with distance in relationship to other second-degree relatives.    Otherwise, the reported family history is unremarkable for multiple miscarriages, stillbirths, birth defects, intellectual disabilities, autism spectrum disorder, developmental delays, cancer diagnosed under 50, known genetic conditions, and consanguinity.     RISK ASSESSMENT FOR CHROMOSOME CONDITIONS   We explained that the risk for fetal chromosome abnormalities increases with maternal age. We discussed specific features of common chromosome abnormalities, including Down syndrome, trisomy 13, trisomy 18, and sex chromosome trisomies.    At age 28 at midtrimester, the risk to have a baby with Down syndrome is 1 in 855.  At age 28 at midtrimester, the risk to have a baby with any chromosome abnormality is 1 in 428.     We also discussed that current ACMG guidelines recommend that screening for 22q11.2 deletion syndrome be offered to all pregnant patients. 22q11.2 deletion syndrome has an estimated prevalence of 1 in 990 to 1 in 2148 (0.05-0.1%). Risk is not thought to increase with maternal age. Clinical features are variable but include congenital heart defects, cleft palate, developmental delays, immune system deficiencies, and hearing loss. Approximately 90% of cases are de geovany (a sporadic  new change in a pregnancy). Cell-free DNA screening for 22q11.2 deletion syndrome is available (expanded NIPS through [employer]). We discussed the limitations of cell-free DNA screening in detecting microdeletions and the possiblity of false positives and false negatives. The data on this condition is more limited, so there is not a positive or negative predictive value available for results interpretation.    Christi has not had genetic screening in this pregnancy but elected to have screening today.      GENETIC TESTING OPTIONS FOR CHROMOSOMAL CONDITIONS   Genetic testing during a pregnancy includes screening and diagnostic procedures.      Screening tests are non-invasive which means no risk to the pregnancy and includes ultrasounds and blood work. The benefits and limitations of screening were reviewed. Screening tests provide a risk assessment (chance) specific to the pregnancy for certain fetal chromosome abnormalities but cannot definitively diagnose or exclude a fetal chromosome abnormality. Follow-up genetic counseling and consideration of diagnostic testing is recommended with any abnormal screening result. Diagnostic testing during a pregnancy is more certain and can test for more conditions. However, the tests do have a risk of miscarriage that requires careful consideration. These tests can detect fetal chromosome abnormalities with greater than 99% certainty. Results can be compromised by maternal cell contamination or mosaicism and are limited by the resolution of current genetic testing technology.     There is no screening or diagnostic test that detects all forms of birth defects or intellectual disability.     We discussed the following screening options:   Non-invasive prenatal screening (NIPS)  Also called cell-free DNA screening because it detects chromosomes from the placenta in the pregnant person's blood  Can be done any time after 10 weeks gestation  Screens for trisomy 21, trisomy  18, trisomy 13, and sex chromosome aneuploidies  Expanded NIPS also allows for reflex to include other microdeletion conditions and rare autosomal trisomies if an indication would arise later in the pregnancy. The patient elected to pursue screening for 22q11.2 deletion syndrome.   Cannot screen for open neural tube defects, maternal serum AFP after 15 weeks is recommended    We discussed the following ultrasound options:  Nuchal translucency (NT) ultrasound  Ultrasound between 23k8g-33q2r that includes nuchal translucency measurement and nasal bone assessments  Nuchal translucency refers to the space at the back of the neck where fluid builds up. All babies at this stage have fluid and there is only concern if there is too much fluid  Nasal bone refers to the small bone in the nose. There is concern for conditions like Down syndrome if the bone cannot be seen at all  This ultrasound can be done as part of first trimester screening, at the same time as another screen (NIPS), at the same time as a CVS, or if the patients does not want genetic screening.  Markers on ultrasound detects about 70% of pregnancies with aneuploidy  Abnormalities on NT ultrasound can also increase the risk for a birth defect, like a heart defect    We discussed the following diagnostic options:   Chorionic villus sampling (CVS)  Invasive diagnostic procedure done between 10w0d and 13w6d  The procedure collects a small sample from the placenta for the purpose of chromosomal testing and/or other genetic testing  Diagnostic result; more than 99% sensitivity for fetal chromosome abnormalities  Cannot screen for open neural tube defects, maternal serum AFP after 15 weeks is recommended  Amniocentesis  Invasive diagnostic procedure done after 15 weeks gestation  The procedure collects a small sample of amniotic fluid for the purpose of chromosomal testing and/or other genetic testing  Diagnostic result; more than 99% sensitivity for fetal  chromosome abnormalities  Testing for AFP in the amniotic fluid can test for open neural tube defects    CARRIER SCREENING   Expanded carrier screening is available to screen for autosomal recessive conditions and X-linked conditions in a large list of genes. Autosomal recessive conditions happen when a mutation has been inherited from the egg and sperm and include conditions like cystic fibrosis, thalassemia, hearing loss, spinal muscular atrophy, and more. X-linked conditions happen when a mutation has been inherited from the egg and include conditions like fragile X syndrome. [place] screening was also reviewed. About [place]  Screening    The patient elected to pursue carrier screening today. We discussed the following:   Carrier screening does not test the pregnancy but gives a risk assessment for the pregnancy and future pregnancies to have the condition  If both partners are carriers of the same condition, there is a 1 in 4 (25%) chance for any pregnancies they have together to have the condition  There are different size panels or list of conditions for carrier screening. The patient elected for [facility] comprehensive panel of 558 genes including fragile X syndrome.  We discussed examples of various conditions expanded carrier screening screens for  Some conditions cause health problems for carriers  We discussed the Genetic Information Nondiscrimination Act (MONI) and important gaps in the protections it affords  Carrier screening does not test for all genetic and health conditions or risk factors  There are limitations to current technology and results may be updated at a later date  The results typically take 2-3 weeks. They will be available in EPIC and routed to the referring OB provider. The patient can view them in [email or web address] and the lab's patient portal.  The patient's partner elected for [facility] comprehensive panel of 557 genes excluding fragile X syndrome  If an individual is a carrier, family  members could be as well. The patient is encouraged to share this information with relatives.  The lab will communicate the out-of-pocket cost with the patient and will also provide an option to switch to self-pay. The default is billing through insurance, and it is the patient's responsibility to respond to the communication if they would like to switch to self-pay.         It was a pleasure to be involved with Lierin s care. Face-to-face time of the meeting was  40  minutes.    Avis Louie MS, [facility]  Maternal Fetal Medicine  Office: [phone]  [facility]: [phone]   Fax: [phone]  [facility]    Patient seen, evaluated and discussed with the Genetic Counselor. I have verified the content of the note, which accurately reflects my assessment of the patient and the plan of care.   Supervising Genetic Counselor     Mikey Sal GC, MS, [facility]  Board Certified and [place] Licensed Genetic Counselor   [facility]  Maternal Fetal Medicine  Office: [phone]  [facility]: [phone]   Fax: [phone]  [facility]

## 2023-12-07 NOTE — PROGRESS NOTES
Please see the full imaging report from the [employer] program under the imaging tab.    Mary Ann Gonzalez MD  Maternal Fetal Medicine

## 2023-12-12 ENCOUNTER — TELEPHONE (OUTPATIENT)
Dept: MATERNAL FETAL MEDICINE | Facility: CLINIC | Age: 28
End: 2023-12-12
Payer: COMMERCIAL

## 2023-12-12 LAB — SCANNED LAB RESULT: NORMAL

## 2023-12-12 NOTE — CONFIDENTIAL NOTE
December 12, 2023    I left a message for Christi regarding her non-invasive prenatal screen (NIPS) results.     Results indicate NO ANEUPLOIDY DETECTED for chromosomes 21, 18, 13, or sex chromosomes (XX -FEMALE). Results were also negative for 22q11.2 deletion syndrome.    This puts her current pregnancy at low risk for Down syndrome, trisomy 18, trisomy 13, sex chromosome abnormalities, and 22q11.2 deletion syndrome. This test is reported to have the following sensitivities: Down syndrome: 99.99%, trisomy 18: 99.99%, and trisomy 13: 99.99%. Although these results are reassuring, this does not replace a standard chromosome analysis from a chorionic villus sampling or amniocentesis.     Level II ultrasound is recommended and has been scheduled for 01/15/2024.    MSAFP is the appropriate second trimester screening test for open neural tube defects; the maternal quad screen is not recommended.    Her results will be made available in her Epic chart for her primary OB to review.       Avis Louie MS, [facility]  Maternal Fetal Medicine  Office: [phone]  [facility]: [phone]   Fax: [phone]  [facility] [place]

## 2023-12-13 LAB — SCANNED LAB RESULT: ABNORMAL

## 2023-12-26 ENCOUNTER — TELEPHONE (OUTPATIENT)
Dept: MATERNAL FETAL MEDICINE | Facility: CLINIC | Age: 28
End: 2023-12-26

## 2023-12-26 ENCOUNTER — PRENATAL OFFICE VISIT (OUTPATIENT)
Dept: OBGYN | Facility: CLINIC | Age: 28
End: 2023-12-26
Payer: COMMERCIAL

## 2023-12-26 VITALS
DIASTOLIC BLOOD PRESSURE: 75 MMHG | BODY MASS INDEX: 37.78 KG/M2 | SYSTOLIC BLOOD PRESSURE: 125 MMHG | TEMPERATURE: 97.8 F | OXYGEN SATURATION: 97 % | WEIGHT: 244.8 LBS | HEART RATE: 100 BPM

## 2023-12-26 DIAGNOSIS — Z34.02 ENCOUNTER FOR SUPERVISION OF NORMAL FIRST PREGNANCY, SECOND TRIMESTER: Primary | ICD-10-CM

## 2023-12-26 DIAGNOSIS — R30.0 DYSURIA: ICD-10-CM

## 2023-12-26 LAB
ALBUMIN UR-MCNC: ABNORMAL MG/DL
APPEARANCE UR: CLEAR
BACTERIA #/AREA URNS HPF: ABNORMAL /HPF
BILIRUB UR QL STRIP: ABNORMAL
COLOR UR AUTO: YELLOW
GLUCOSE UR STRIP-MCNC: NEGATIVE MG/DL
HGB UR QL STRIP: NEGATIVE
KETONES UR STRIP-MCNC: ABNORMAL MG/DL
LEUKOCYTE ESTERASE UR QL STRIP: ABNORMAL
MUCOUS THREADS #/AREA URNS LPF: PRESENT /LPF
NITRATE UR QL: NEGATIVE
PH UR STRIP: 6.5 [PH] (ref 5–7)
RBC #/AREA URNS AUTO: ABNORMAL /HPF
SP GR UR STRIP: 1.02 (ref 1–1.03)
SQUAMOUS #/AREA URNS AUTO: ABNORMAL /LPF
UROBILINOGEN UR STRIP-ACNC: 0.2 E.U./DL
WBC #/AREA URNS AUTO: ABNORMAL /HPF

## 2023-12-26 PROCEDURE — 99207 PR PRENATAL VISIT: CPT

## 2023-12-26 PROCEDURE — 87086 URINE CULTURE/COLONY COUNT: CPT

## 2023-12-26 PROCEDURE — 81001 URINALYSIS AUTO W/SCOPE: CPT

## 2023-12-26 NOTE — CONFIDENTIAL NOTE
Carrier Screening Results Disclosure  [facility] Maternal Fetal Medicine    I spoke with Christi today to review her carrier screening results (558 gene panel through [facility]). Her partner, Moo, also had carrier screening performed (557 gene panel through [facility]). His screening included the same genes as Christi's screening other than FMR1, which is associated with the X-chromosome linked condition Fragile X Syndrome. Moo previously signed an authorization to share protected information form to discuss his results with Christi.    Christi was found to harbor pathogenic variants in 4 genes. Moo  was found to harbor pathogenic variants in 2 genes. The couple did not screen mutually positive for any conditions and Christi did not screen positive for any X-linked conditions. This greatly reduces the chances that their current pregnancy or future pregnancies could have any of these conditions.       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  Moo was not found to be a carrier of this condition, so the residual risk for the couple to have a child with cystic fibrosis is 1 in 17,600 (0.006%) and a child with a more mild form is 1 in 3,200 (0.03%).  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 Symptoms include feeding difficulties, vomiting, hepatomegaly, liver dysfunction,  cataracts, developmental delay, and deafness.  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   Severity of symptoms and age of onset vary across different subtypes  Moo was not found to be a carrier of this condition, so the residual risk for the couple to have a child with this condition is < 1 in 2,000 (< 0.05%).  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 This condition can also cause vision concerns such as fair or nearsightedness, photophobia, nystagmus, and strabismus.  Moo was not found to be a carrier of this condition, so the residual risk for the couple to have a child with this condition is 1 in 37,600 (0.003%).     Moo was found to be a carrier of:  Spinal muscular atrophy, SMN1 deletion   This condition results in the loss of nerves in the spinal cord which leads to muscle weakness, muscle twitching, tremors, sleep difficulties, and can lead to difficulty eating or breathing.  Severity of symptoms and age of onset vary across different subtypes of spinal muscular atrophy  Christi was not found to be a carrier of this condition, so the residual risk for the couple to have a child with this condition is 1 in 3,200 (0.03%).  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 However, if these two variants are on different chromosomes, Moo would be considered a  carrier.  This condition results in impaired production of hormone produced by the adrenal glands. Different subtypes of this condition influence the severity and type of symptoms seen.  The salt-wasting form is the most severe and results in large amounts of sodium in the urine and can be life-threatening in infancy. It can additionally cause masculinization of genitalia in females and decreased fertility.  The simple virilizing form does not result in salt-wasting but can lead to additional features seen including masculinization of genitalia in females and decreased fertility.  The non-classic form may result in decreased fertility, hirsutism, and male-pattern baldness.  Christi was not found to be a carrier of this condition, so the residual risk for the couple to have a child with this condition is 1 in 3,004 (0.03%).    Other Results of Note   For Christi's screening, normal triplet repeats (CGG nucleotides) were observed in the FMR1 gene at lengths of 23 and 29 (normal: <45 CGG repeats; intermediate carrier: 45-54 CGG repeats; premutation carrier:  CGG repeats; full mutation/Fragile X Syndrome: >200 CGG repeats). Given that Christi has normal repeat lengths, she is not expected to be a carrier of Fragile X Syndrome.    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 This variant is classified as benign by [facility]. In combination with a classic GALT variant, it may reduce enzyme activity which can trigger a positive for galactosemia on  screening. However, it does not require dietary intervention and does not cause significant clinical consequences.      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  Pseudodeficiency alleles are not known to be associated with disease, including Krabbe.     Moo was also found to have a  pseudodeficiency allele.  Benign change, c.742G>A, known to be a pseudodeficiency allele, identified in [facility].  Pseudodeficiency alleles are not known to be associated with disease, including Krabbe.    We discussed that the presence of a pseudodeficiency allele does not impact the risk to be a carrier. Carrier testing for reproductive partners is not indicated based on these results. People with pseudodeficiency alleles may have false positive results on biochemical tests such as  screening. Pseudodeficiency alleles are not known to cause disease, even when homozygous or in combination with another disease-causing variant (compound heterozygous).  Christi was encouraged to share these results with a provider should she have a child screen positive on  screening.    Familial Screening   These results do have implications for Mich's relatives. For each respective gene related to these conditions, each partner has a 50% chance to pass on the pathogenic variant to each future child. This means each child would have a 50% chance to also be a carrier of the above mentioned conditions. Mich's other first degree relatives also have a 50% risk to carry each of these conditions. Sharing this information with family members could be beneficial.         Avis Louie MS, [facility]  Maternal Fetal Medicine  Office: [phone]  [facility]: [phone]   Fax: [phone]  [facility]

## 2023-12-26 NOTE — PROGRESS NOTES
15.1 wks today  Feeling well.  Normal NIPT expecting a girl!  Denies nausea or constipation, bleeding or LOF.  Not feeling fetal movement yet.  Is having difficulty sleeping, feels uncomfortable, and itchy mostly, taking allergy meds, no relief. Discussed comfort measures, cool dark room, cotton clothing, washing bedding regularly, using a pregnancy pillow or body pillow for positioning comfort. Consider vistaril, would recc PRN when feeling really itchy, she will look into this.  Also having crust like dry skin on nipples, tried coconut oil, no relief, an epsom salt bath helped improve things. Discussed irritation can come from detergents, soaps, lotions, weather changes. Seeing if she can find any focal source of irritation, try a cotton more breathable bra, consider something like a nipple cream, or aquaphor ointment for skin barrier.  Feeling some low abd discomfort, similar to when she's had a uti in the past, recc UA/UC to r/o UTI today, discussed in absence of infection, sometimes people start to feel round ligament discomfort, stretches, movement and support belts can help.  Has anatomy scan scheduled.  Discussed gtt at 24 wks  1. Encounter for supervision of normal first pregnancy, second trimester  Anatomy scan 1/15    2. Dysuria  - UA with Microscopic - lab collect; Future  - Urine Culture Aerobic Bacterial - lab collect; Future  - UA with Microscopic - lab collect  - Urine Culture Aerobic Bacterial - lab collect    Rtc 4 weeks  AIDEN Renee CNP

## 2023-12-27 LAB — BACTERIA UR CULT: NORMAL

## 2024-01-15 ENCOUNTER — HOSPITAL ENCOUNTER (OUTPATIENT)
Dept: ULTRASOUND IMAGING | Facility: CLINIC | Age: 29
Discharge: HOME | End: 2024-01-15
Attending: OBSTETRICS & GYNECOLOGY
Payer: COMMERCIAL

## 2024-01-15 ENCOUNTER — OFFICE VISIT (OUTPATIENT)
Dept: MATERNAL FETAL MEDICINE | Facility: CLINIC | Age: 29
End: 2024-01-15
Attending: OBSTETRICS & GYNECOLOGY
Payer: COMMERCIAL

## 2024-01-15 DIAGNOSIS — O26.90 PREGNANCY RELATED CONDITION, ANTEPARTUM: ICD-10-CM

## 2024-01-15 DIAGNOSIS — O99.212 MATERNAL OBESITY SYNDROME, ANTEPARTUM, SECOND TRIMESTER: Primary | ICD-10-CM

## 2024-01-15 PROCEDURE — 76811 OB US DETAILED SNGL FETUS: CPT

## 2024-01-15 PROCEDURE — 76811 OB US DETAILED SNGL FETUS: CPT | Mod: 26 | Performed by: OBSTETRICS & GYNECOLOGY

## 2024-01-15 NOTE — NURSING NOTE
Patient presents to [facility] for L2US at 18w0d due to maternal BMI 38. Positive fetal movement. Denies LOF, vaginal bleeding or cramping/contractions. SBAR given to MASON MD, see their note in Epic.

## 2024-01-24 ENCOUNTER — PRENATAL OFFICE VISIT (OUTPATIENT)
Dept: OBGYN | Facility: CLINIC | Age: 29
End: 2024-01-24
Payer: COMMERCIAL

## 2024-01-24 VITALS
WEIGHT: 243 LBS | OXYGEN SATURATION: 98 % | HEART RATE: 115 BPM | DIASTOLIC BLOOD PRESSURE: 73 MMHG | BODY MASS INDEX: 37.5 KG/M2 | SYSTOLIC BLOOD PRESSURE: 114 MMHG

## 2024-01-24 DIAGNOSIS — Z34.02 ENCOUNTER FOR SUPERVISION OF NORMAL FIRST PREGNANCY, SECOND TRIMESTER: Primary | ICD-10-CM

## 2024-01-24 PROCEDURE — 99207 PR PRENATAL VISIT: CPT | Performed by: OBSTETRICS & GYNECOLOGY

## 2024-01-24 NOTE — PROGRESS NOTES
19w2d  Has felt movement before, but not lately.  Has anterior placenta, so discussed this can be normal.  Lots of movement heard with doptones  Has follow-up US to complete Level 2   RTC 5w with GCT.  Camila Kim MD

## 2024-02-28 ENCOUNTER — PRENATAL OFFICE VISIT (OUTPATIENT)
Dept: OBGYN | Facility: CLINIC | Age: 29
End: 2024-02-28
Attending: OBSTETRICS & GYNECOLOGY
Payer: COMMERCIAL

## 2024-02-28 ENCOUNTER — OFFICE VISIT (OUTPATIENT)
Dept: MATERNAL FETAL MEDICINE | Facility: CLINIC | Age: 29
End: 2024-02-28
Attending: OBSTETRICS & GYNECOLOGY
Payer: COMMERCIAL

## 2024-02-28 ENCOUNTER — HOSPITAL ENCOUNTER (OUTPATIENT)
Dept: ULTRASOUND IMAGING | Facility: CLINIC | Age: 29
Discharge: HOME | End: 2024-02-28
Attending: OBSTETRICS & GYNECOLOGY
Payer: COMMERCIAL

## 2024-02-28 VITALS
OXYGEN SATURATION: 95 % | HEART RATE: 107 BPM | SYSTOLIC BLOOD PRESSURE: 139 MMHG | DIASTOLIC BLOOD PRESSURE: 87 MMHG | WEIGHT: 245 LBS | BODY MASS INDEX: 37.81 KG/M2

## 2024-02-28 DIAGNOSIS — O35.EXX0 PYELECTASIS OF FETUS ON PRENATAL ULTRASOUND: Primary | ICD-10-CM

## 2024-02-28 DIAGNOSIS — O99.212 MATERNAL OBESITY SYNDROME, ANTEPARTUM, SECOND TRIMESTER: ICD-10-CM

## 2024-02-28 DIAGNOSIS — Z34.02 ENCOUNTER FOR SUPERVISION OF NORMAL FIRST PREGNANCY, SECOND TRIMESTER: Primary | ICD-10-CM

## 2024-02-28 LAB
GLUCOSE 1H P 50 G GLC PO SERPL-MCNC: 137 MG/DL (ref 70–129)
HGB BLD-MCNC: 11.2 G/DL (ref 11.7–15.7)

## 2024-02-28 PROCEDURE — 99207 PR PRENATAL VISIT: CPT | Performed by: OBSTETRICS & GYNECOLOGY

## 2024-02-28 PROCEDURE — 36415 COLL VENOUS BLD VENIPUNCTURE: CPT | Performed by: OBSTETRICS & GYNECOLOGY

## 2024-02-28 PROCEDURE — 76816 OB US FOLLOW-UP PER FETUS: CPT | Mod: 26 | Performed by: OBSTETRICS & GYNECOLOGY

## 2024-02-28 PROCEDURE — 76816 OB US FOLLOW-UP PER FETUS: CPT

## 2024-02-28 PROCEDURE — 82950 GLUCOSE TEST: CPT | Performed by: OBSTETRICS & GYNECOLOGY

## 2024-02-28 PROCEDURE — 99213 OFFICE O/P EST LOW 20 MIN: CPT | Mod: 25 | Performed by: OBSTETRICS & GYNECOLOGY

## 2024-02-28 NOTE — PROGRESS NOTES
"Please see \"Imaging\" tab under \"Chart Review\" for details of today's visit.    Michael Amador    "

## 2024-02-28 NOTE — NURSING NOTE
Christi Barrientos is a  at 24w2d who presents to [facility] for RL2. Pt reports positive fetal movement. SBAR given to Dr. Amador, see note in Epic.

## 2024-02-28 NOTE — PROGRESS NOTES
24w2d  Doing well.  No particular concerns today.  Feeling movement.  GCT and hgb today.  RTC 4w.  Camila Kim MD

## 2024-03-07 ENCOUNTER — LAB (OUTPATIENT)
Dept: LAB | Facility: CLINIC | Age: 29
End: 2024-03-07
Payer: COMMERCIAL

## 2024-03-07 DIAGNOSIS — R73.09 ELEVATED GLUCOSE TOLERANCE TEST: Primary | ICD-10-CM

## 2024-03-07 DIAGNOSIS — R73.09 ELEVATED GLUCOSE TOLERANCE TEST: ICD-10-CM

## 2024-03-07 PROCEDURE — 82951 GLUCOSE TOLERANCE TEST (GTT): CPT

## 2024-03-07 PROCEDURE — 82952 GTT-ADDED SAMPLES: CPT

## 2024-03-07 PROCEDURE — 36415 COLL VENOUS BLD VENIPUNCTURE: CPT

## 2024-03-08 LAB
GESTATIONAL GTT 1 HR POST DOSE: 169 MG/DL (ref 60–179)
GESTATIONAL GTT 2 HR POST DOSE: 130 MG/DL (ref 60–154)
GESTATIONAL GTT 3 HR POST DOSE: 120 MG/DL (ref 60–139)
GLUCOSE P FAST SERPL-MCNC: 91 MG/DL (ref 60–94)

## 2024-03-17 ENCOUNTER — HEALTH MAINTENANCE LETTER (OUTPATIENT)
Age: 29
End: 2024-03-17

## 2024-03-25 ENCOUNTER — PRENATAL OFFICE VISIT (OUTPATIENT)
Dept: MIDWIFE SERVICES | Facility: CLINIC | Age: 29
End: 2024-03-25
Payer: COMMERCIAL

## 2024-03-25 VITALS
DIASTOLIC BLOOD PRESSURE: 69 MMHG | WEIGHT: 249.1 LBS | HEART RATE: 109 BPM | BODY MASS INDEX: 38.44 KG/M2 | OXYGEN SATURATION: 98 % | SYSTOLIC BLOOD PRESSURE: 118 MMHG

## 2024-03-25 DIAGNOSIS — Z34.03 ENCOUNTER FOR SUPERVISION OF NORMAL FIRST PREGNANCY, THIRD TRIMESTER: Primary | ICD-10-CM

## 2024-03-25 DIAGNOSIS — Z23 NEED FOR TDAP VACCINATION: ICD-10-CM

## 2024-03-25 PROCEDURE — 99207 PR PRENATAL VISIT: CPT | Performed by: ADVANCED PRACTICE MIDWIFE

## 2024-03-25 PROCEDURE — 90715 TDAP VACCINE 7 YRS/> IM: CPT | Performed by: ADVANCED PRACTICE MIDWIFE

## 2024-03-25 PROCEDURE — 90471 IMMUNIZATION ADMIN: CPT | Performed by: ADVANCED PRACTICE MIDWIFE

## 2024-03-25 RX ORDER — BREAST PUMP
1 EACH MISCELLANEOUS PRN
Qty: 1 EACH | Refills: 0 | Status: SHIPPED | OUTPATIENT
Start: 2024-03-25 | End: 2024-08-07

## 2024-03-25 ASSESSMENT — ANXIETY QUESTIONNAIRES
6. BECOMING EASILY ANNOYED OR IRRITABLE: NEARLY EVERY DAY
2. NOT BEING ABLE TO STOP OR CONTROL WORRYING: NOT AT ALL
GAD7 TOTAL SCORE: 6
1. FEELING NERVOUS, ANXIOUS, OR ON EDGE: NOT AT ALL
3. WORRYING TOO MUCH ABOUT DIFFERENT THINGS: SEVERAL DAYS
7. FEELING AFRAID AS IF SOMETHING AWFUL MIGHT HAPPEN: NOT AT ALL
GAD7 TOTAL SCORE: 6
IF YOU CHECKED OFF ANY PROBLEMS ON THIS QUESTIONNAIRE, HOW DIFFICULT HAVE THESE PROBLEMS MADE IT FOR YOU TO DO YOUR WORK, TAKE CARE OF THINGS AT HOME, OR GET ALONG WITH OTHER PEOPLE: NOT DIFFICULT AT ALL
5. BEING SO RESTLESS THAT IT IS HARD TO SIT STILL: NOT AT ALL

## 2024-03-25 ASSESSMENT — PATIENT HEALTH QUESTIONNAIRE - PHQ9
SUM OF ALL RESPONSES TO PHQ QUESTIONS 1-9: 10
5. POOR APPETITE OR OVEREATING: MORE THAN HALF THE DAYS

## 2024-03-25 NOTE — PROGRESS NOTES
28w0d  Patient is feeling well. Positive fetal movement, intermittent lower  uterine cramping lasting 20 minutes in duration, occurs at the same time every day, resolves spontaneously. Denies water leaking, vaginal bleeding, decreased fetal movement, contraction pain, or other concerns.   Christi reports she is starting to express drops of colostrum, hand expression kit given, recommended seeing lactation consultant, prescription for breast pump given but advised not to utilize a breast pump at this time as this may increase risk for PTL. She expressed the desire to start saving milk now, educated on  needs for the first few months postpartum. TDAP given today    Danger signs discussed, educated on s/s of preeclampsia and PTL.  Reinforced when to call triage, confirmed she has phone numbers.       RAJESH Terry    I was present with the CNM student who participated in the service and in the documentation of the services provided. I have verified the history and personally performed the physical exam and medical decision making, as documented by the student and edited by me.     Skye Thomas, JAUN, AIDEN ZAMORANO CNM

## 2024-05-09 ENCOUNTER — MYC MEDICAL ADVICE (OUTPATIENT)
Dept: MIDWIFE SERVICES | Facility: CLINIC | Age: 29
End: 2024-05-09

## 2024-05-09 ENCOUNTER — PRENATAL OFFICE VISIT (OUTPATIENT)
Dept: MIDWIFE SERVICES | Facility: CLINIC | Age: 29
End: 2024-05-09
Payer: COMMERCIAL

## 2024-05-09 VITALS
OXYGEN SATURATION: 98 % | DIASTOLIC BLOOD PRESSURE: 78 MMHG | HEART RATE: 85 BPM | BODY MASS INDEX: 38.96 KG/M2 | WEIGHT: 252.5 LBS | SYSTOLIC BLOOD PRESSURE: 120 MMHG

## 2024-05-09 DIAGNOSIS — Z34.03 ENCOUNTER FOR SUPERVISION OF NORMAL FIRST PREGNANCY, THIRD TRIMESTER: Primary | ICD-10-CM

## 2024-05-09 DIAGNOSIS — Z88.0 HISTORY OF PENICILLIN ALLERGY: Primary | ICD-10-CM

## 2024-05-09 PROCEDURE — 99207 PR PRENATAL VISIT: CPT | Performed by: ADVANCED PRACTICE MIDWIFE

## 2024-05-09 NOTE — PATIENT INSTRUCTIONS
Seferino Gaspar     Nice to meet you today! Below is information about the things we discussed at your appointment. See you in 2 weeks! Let us know if you need anything :)    Allergy referral - please call [phone] to make appointment as soon as possible    Birthplace will be Tuesdays at 5pm and Saturdays at 11am.  You do not need to sign up in advance.  Please arrive a few minutes early and meet at the security desk at the main entrance of the [facility] (on [address]   You can park below the hospital in the [place].    About the midwife team:     You are welcome to make your appointments with any one of us.  If you would like to try and meet us all, please see the list.  If it works out better for your schedule, or you prefer, you could make your appointments with just a few of us.  You can also see us at the [facility] on Wednesdays.    Here is a list of the Certified Nurse Midwives in our group;   - Mell Porter  - Masoud Segovia,   - Skye Thomas,   - Alyssia Begum,   - Isabel Pierce,   - Cristina Crespo,  - Trisha Vera.    We work as a team and share call at the hospital so it may be any one of us seven with you when you labor and birth.    If you have questions or concerns during your pregnancy please call us at [phone].  This routes you directly to our OB/Gyn clinic staff and nurses during business hours.  After hours your call will be routed to a central answering service and nurse line.    [employer] messages are great for communicating about non urgent matters.  If you are in labor, please call.  We would much rather connect with you then have you be at home and worried.    We wish you the very best, and we are excited to be part of your journey.    AIDEN Paz

## 2024-05-09 NOTE — PROGRESS NOTES
34w3d  Feeling well, no concerns. Baby is active. No regular contractions, LOF or bleeding.   Last appointment 6 weeks ago, unintentional lapse in care. Discussed continuing care with CNM team OR OB/GYN team. Questions answered and patient will continue with CNM service.   Planning epidural for pain management  Planning to breastfeed, has breast pump  Has f/up MFM US on 5/16 for mild pyelectasis  Encouraged to make appointment with allergy MD for h/o PCN allergy as child  RTC in 2 weeks for GBS, hgb, BSUS  Alyssia Begum CNM

## 2024-05-16 ENCOUNTER — OFFICE VISIT (OUTPATIENT)
Dept: MATERNAL FETAL MEDICINE | Facility: CLINIC | Age: 29
End: 2024-05-16
Attending: OBSTETRICS & GYNECOLOGY
Payer: COMMERCIAL

## 2024-05-16 ENCOUNTER — HOSPITAL ENCOUNTER (OUTPATIENT)
Dept: ULTRASOUND IMAGING | Facility: CLINIC | Age: 29
Discharge: HOME | End: 2024-05-16
Attending: OBSTETRICS & GYNECOLOGY
Payer: COMMERCIAL

## 2024-05-16 DIAGNOSIS — O35.EXX0 PYELECTASIS OF FETUS ON PRENATAL ULTRASOUND: ICD-10-CM

## 2024-05-16 DIAGNOSIS — O99.210 OBESITY IN PREGNANCY: Primary | ICD-10-CM

## 2024-05-16 PROCEDURE — 76816 OB US FOLLOW-UP PER FETUS: CPT | Mod: 26 | Performed by: OBSTETRICS & GYNECOLOGY

## 2024-05-16 PROCEDURE — 76816 OB US FOLLOW-UP PER FETUS: CPT

## 2024-05-16 NOTE — PROGRESS NOTES
The patient was seen for an ultrasound in the [facility] at the [facility] today.  For a detailed report of the ultrasound examination, please see the ultrasound report which can be found under the imaging tab.    If you have questions regarding today's evaluation or if we can be of further service, please contact the Maternal[facility]Fetal Medicine Center.    Allie Dillard MD  , OB/GYN  Maternal-Fetal Medicine  [phone] (Pager)

## 2024-05-16 NOTE — NURSING NOTE
Patient reports positive fetal movement,  denies contractions, leaking of fluid, or bleeding.  SBAR given to DOMINICK CLARK, see their note in Epic.

## 2024-05-17 ENCOUNTER — HOSPITAL ENCOUNTER (OUTPATIENT)
Facility: CLINIC | Age: 29
Discharge: HOME | End: 2024-05-17
Attending: ADVANCED PRACTICE MIDWIFE | Admitting: ADVANCED PRACTICE MIDWIFE
Payer: COMMERCIAL

## 2024-05-17 ENCOUNTER — MYC MEDICAL ADVICE (OUTPATIENT)
Dept: DERMATOLOGY | Facility: CLINIC | Age: 29
End: 2024-05-17

## 2024-05-17 ENCOUNTER — HOSPITAL ENCOUNTER (OUTPATIENT)
Facility: CLINIC | Age: 29
End: 2024-05-17
Attending: ADVANCED PRACTICE MIDWIFE | Admitting: ADVANCED PRACTICE MIDWIFE
Payer: COMMERCIAL

## 2024-05-17 ENCOUNTER — MYC MEDICAL ADVICE (OUTPATIENT)
Dept: DERMATOLOGY | Facility: CLINIC | Age: 29
End: 2024-05-17
Payer: COMMERCIAL

## 2024-05-17 ENCOUNTER — HOSPITAL ENCOUNTER (OUTPATIENT)
Facility: CLINIC | Age: 29
End: 2024-05-17
Admitting: ADVANCED PRACTICE MIDWIFE
Payer: COMMERCIAL

## 2024-05-17 VITALS — RESPIRATION RATE: 16 BRPM | SYSTOLIC BLOOD PRESSURE: 136 MMHG | DIASTOLIC BLOOD PRESSURE: 83 MMHG | TEMPERATURE: 98.1 F

## 2024-05-17 PROBLEM — O46.93 VAGINAL BLEEDING IN PREGNANCY, THIRD TRIMESTER: Status: ACTIVE | Noted: 2024-05-17

## 2024-05-17 PROBLEM — O47.03 PRETERM UTERINE CONTRACTIONS IN THIRD TRIMESTER, ANTEPARTUM: Status: ACTIVE | Noted: 2024-05-17

## 2024-05-17 LAB
ABO/RH(D): NORMAL
ANTIBODY SCREEN: NEGATIVE
APTT PPP: 28 SECONDS (ref 22–38)
CLUE CELLS: ABNORMAL
CRYSTALS AMN MICRO: NORMAL
FETAL RBC % LFV: 0 %
FETAL RBC (ML): 0 ML
FIBRINOGEN PPP-MCNC: 736 MG/DL (ref 170–490)
IF INDICATED RECOMMENDED DOSE OF RH IMMUNE GLOBULIN UG: 300 UG
INR PPP: 0.98 (ref 0.85–1.15)
SPECIMEN EXPIRATION DATE: NORMAL
TRICHOMONAS, WET PREP: ABNORMAL
WBC'S/HIGH POWER FIELD, WET PREP: ABNORMAL
YEAST, WET PREP: ABNORMAL

## 2024-05-17 PROCEDURE — 36415 COLL VENOUS BLD VENIPUNCTURE: CPT | Performed by: ADVANCED PRACTICE MIDWIFE

## 2024-05-17 PROCEDURE — G0463 HOSPITAL OUTPT CLINIC VISIT: HCPCS | Mod: 25

## 2024-05-17 PROCEDURE — 258N000003 HC RX IP 258 OP 636: Performed by: ADVANCED PRACTICE MIDWIFE

## 2024-05-17 PROCEDURE — 85730 THROMBOPLASTIN TIME PARTIAL: CPT | Performed by: ADVANCED PRACTICE MIDWIFE

## 2024-05-17 PROCEDURE — 59025 FETAL NON-STRESS TEST: CPT | Mod: 26 | Performed by: ADVANCED PRACTICE MIDWIFE

## 2024-05-17 PROCEDURE — 87491 CHLMYD TRACH DNA AMP PROBE: CPT | Performed by: ADVANCED PRACTICE MIDWIFE

## 2024-05-17 PROCEDURE — 87591 N.GONORRHOEAE DNA AMP PROB: CPT | Performed by: ADVANCED PRACTICE MIDWIFE

## 2024-05-17 PROCEDURE — 96360 HYDRATION IV INFUSION INIT: CPT

## 2024-05-17 PROCEDURE — 87653 STREP B DNA AMP PROBE: CPT | Performed by: ADVANCED PRACTICE MIDWIFE

## 2024-05-17 PROCEDURE — 85610 PROTHROMBIN TIME: CPT | Performed by: ADVANCED PRACTICE MIDWIFE

## 2024-05-17 PROCEDURE — 59025 FETAL NON-STRESS TEST: CPT

## 2024-05-17 PROCEDURE — 999N000105 HC STATISTIC NO DOCUMENTATION TO SUPPORT CHARGE

## 2024-05-17 PROCEDURE — 99213 OFFICE O/P EST LOW 20 MIN: CPT | Mod: 25 | Performed by: ADVANCED PRACTICE MIDWIFE

## 2024-05-17 PROCEDURE — 85460 HEMOGLOBIN FETAL: CPT | Performed by: ADVANCED PRACTICE MIDWIFE

## 2024-05-17 PROCEDURE — 87210 SMEAR WET MOUNT SALINE/INK: CPT | Performed by: ADVANCED PRACTICE MIDWIFE

## 2024-05-17 PROCEDURE — 86900 BLOOD TYPING SEROLOGIC ABO: CPT | Performed by: ADVANCED PRACTICE MIDWIFE

## 2024-05-17 PROCEDURE — 85384 FIBRINOGEN ACTIVITY: CPT | Performed by: ADVANCED PRACTICE MIDWIFE

## 2024-05-17 RX ORDER — PROCHLORPERAZINE 25 MG
25 SUPPOSITORY, RECTAL RECTAL EVERY 12 HOURS PRN
Status: DISCONTINUED | OUTPATIENT
Start: 2024-05-17 | End: 2024-05-17 | Stop reason: HOSPADM

## 2024-05-17 RX ORDER — ONDANSETRON 4 MG/1
4 TABLET, ORALLY DISINTEGRATING ORAL EVERY 6 HOURS PRN
Status: DISCONTINUED | OUTPATIENT
Start: 2024-05-17 | End: 2024-05-17 | Stop reason: HOSPADM

## 2024-05-17 RX ORDER — SODIUM CHLORIDE, SODIUM LACTATE, POTASSIUM CHLORIDE, CALCIUM CHLORIDE 600; 310; 30; 20 MG/100ML; MG/100ML; MG/100ML; MG/100ML
INJECTION, SOLUTION INTRAVENOUS
Status: COMPLETED
Start: 2024-05-17 | End: 2024-05-17

## 2024-05-17 RX ORDER — ACETAMINOPHEN 325 MG/1
650 TABLET ORAL EVERY 4 HOURS PRN
Status: DISCONTINUED | OUTPATIENT
Start: 2024-05-17 | End: 2024-05-17 | Stop reason: HOSPADM

## 2024-05-17 RX ORDER — METOCLOPRAMIDE HYDROCHLORIDE 5 MG/ML
10 INJECTION INTRAMUSCULAR; INTRAVENOUS EVERY 6 HOURS PRN
Status: DISCONTINUED | OUTPATIENT
Start: 2024-05-17 | End: 2024-05-17 | Stop reason: HOSPADM

## 2024-05-17 RX ORDER — ONDANSETRON 2 MG/ML
4 INJECTION INTRAMUSCULAR; INTRAVENOUS EVERY 6 HOURS PRN
Status: DISCONTINUED | OUTPATIENT
Start: 2024-05-17 | End: 2024-05-17 | Stop reason: HOSPADM

## 2024-05-17 RX ORDER — LIDOCAINE 40 MG/G
CREAM TOPICAL
Status: DISCONTINUED | OUTPATIENT
Start: 2024-05-17 | End: 2024-05-17 | Stop reason: HOSPADM

## 2024-05-17 RX ORDER — METOCLOPRAMIDE 10 MG/1
10 TABLET ORAL EVERY 6 HOURS PRN
Status: DISCONTINUED | OUTPATIENT
Start: 2024-05-17 | End: 2024-05-17 | Stop reason: HOSPADM

## 2024-05-17 RX ORDER — PROCHLORPERAZINE MALEATE 10 MG
10 TABLET ORAL EVERY 6 HOURS PRN
Status: DISCONTINUED | OUTPATIENT
Start: 2024-05-17 | End: 2024-05-17 | Stop reason: HOSPADM

## 2024-05-17 RX ADMIN — SODIUM CHLORIDE, POTASSIUM CHLORIDE, SODIUM LACTATE AND CALCIUM CHLORIDE 1000 ML: 600; 310; 30; 20 INJECTION, SOLUTION INTRAVENOUS at 13:36

## 2024-05-17 ASSESSMENT — ACTIVITIES OF DAILY LIVING (ADL)
ADLS_ACUITY_SCORE: 20

## 2024-05-17 NOTE — DISCHARGE SUMMARY
[facility]    Discharge Summary  Obstetrics    Date of Admission:  2024  Date of Discharge:  2024  Discharging Provider: Masoud Segovia CNM    Discharge Diagnoses   O46.93 Vaginal Bleeding in pregnancy, third trimester  O47.03  uterine contractions in third trimester, antepartum      History of Present Illness   Christi Barrientos is a 28 year old female who presented with vaginal bleeding and concern for PPROM. Around 0930 she woke from a nap and when she went to the restroom, noted blood when wiping. After cleaning up the blood, she noted some clear, fluid-like discharge. She was not feeling contractions, but contractions were noted on the monitor upon arrival. Baby was active, no other concerns.     Hemoglobin:   Hemoglobin   Date Value Ref Range Status   2024 11.2 (L) 11.7 - 15.7 g/dL Final   2015 12.9 11.7 - 15.7 g/dL Final      Latest Reference Range & Units 24 11:57 24 12:00 24 12:32   Fern Crystallization No ferning present   No ferning present    INR 0.85 - 1.15    0.98   PTT 22 - 38 Seconds   28   Fibrinogen 170 - 490 mg/dL   736 (H)   ABO/Rh(D)    O POS   Antibody Screen Negative    Negative   SPECIMEN EXPIRATION DATE    [id number]   Clue cells Absent  Absent     CHLAMYDIA TRACHOMATIS PCR  Rpt (IP)     GROUP B STREP PCR  Rpt (IP)     NEISSERIA GONORRHOEAE PCR  Rpt (IP)     WET PREPARATION  Rpt !     Fetal RBC % <0.1 %   0.0   FETAL RBC (ML) mL   0   If Indicated, Recommended Dose of RH Immune Globulin (ug) ug   300   Trichomonas Absent  Absent     WBCs/high power field None  1+ !     Yeast Absent  Absent     (H): Data is abnormally high  !: Data is abnormal  (IP): In Process    Plan:  Follow-up in clinic as scheduled, sooner for concerns.    Discharge Disposition   Discharged to home   Condition at discharge: Stable    Masoud Segovia CNM

## 2024-05-17 NOTE — PROVIDER NOTIFICATION
05/17/24 1135   Provider Notification   Provider Name/Title Masoud Segovia CNM   Method of Notification At Bedside   Request Evaluate in Person   Notification Reason Patient Arrived     Provider at bedside to assess pt for PROM and bleeding. Provider performed speculum exam. Bleeding present in cervix. Swabs collected and sent. SVE performed 1/50/-2. BSUS baby is cephalic. Pt not feeling any contractions at this time. Will reassess cervix in 2 hours and wait for pending labs. Pt agreeable to plan.

## 2024-05-17 NOTE — H&P
HOSPITAL TRIAGE NOTE  ===================    CHIEF COMPLAINT  ========================  Christi Josi Barrientos is a 28 year old patient presenting today at 35w4d for evaluation of Leaking vaginal fluid, Vaginal bleeding.    Patient's last menstrual period was 2023.  Estimated Date of Delivery: 2024 by LMP.    HPI  ==================   Chasity is here because she woke from a nap this morning around 0930 and noted a small amount of vaginal bleeding. After cleaning up, she noted some clear fluid leaking. She denies contractions, baby is active. Denies IC in recent days. No abdominal pain. She has been followed by MFM for BPPs due to BMI over 35, yesterday had comp US showing EFW 29%ile and MVP 5.0cm.     CONTRACTIONS: not felt by patient  ABDOMINAL PAIN: none  FETAL MOVEMENT: active    VAGINAL BLEEDING: small  RUPTURE OF MEMBRANES: unsure  PELVIC PAIN: none    HISTORIES  ==============  ALLERGIES:      Allergies   Allergen Reactions    Penicillins Unknown     Other reaction(s): *Unknown - Pt Doesn't Remember  Childhood allergy.  Unsure reaching, maybe rash vs hives?     PAST MEDICAL HISTORY  History reviewed. No pertinent past medical history.  PARTNER: not present but involved by phone  FAMILY HISTORY  Family History   Problem Relation Age of Onset    Leukemia Mother 41    Diabetes Type 2  Paternal Grandmother     Diabetes Type 2  Paternal Grandfather     No Known Problems Brother     No Known Problems Brother     No Known Problems Brother     No Known Problems Sister     Diabetes Type 2  Paternal Aunt     Diabetes Paternal Uncle     Diabetes Type 2  Paternal Uncle      OB HISTORY  OB History    Para Term  AB Living   1 0 0 0 0 0   SAB IAB Ectopic Multiple Live Births   0 0 0 0 0      # Outcome Date GA Lbr Jose/2nd Weight Sex Type Anes PTL Lv   1 Current                EXAM  ============  /83   Temp 98.1  F (36.7  C) (Oral)   Resp 16   LMP 2023   GENERAL APPEARANCE: healthy,  alert and no distress  ABDOMEN:  soft, nontender,non-tender between contractions no epigastric pain  NEURO: Denies headache, blurred vision  PSYCH: mentation appears normal. and affect normal/bright  LEGS: Reflexes normal bilaterally  Pelvic Exam:  Vulva: No external lesions, normal hair distribution, no adenopathy  Vagina: Moist, pink, small amount of dark red blood in vault, well rugated, no lesions  Cervix: smooth, pink, no visible lesions; 1cm dilated visually      CONTRACTIONS:  EVERY 2-17 MINUTES  mild  FETAL HEART TONES:  135 with moderate variability, accelerations-yes, decels none.  NST: REACTIVE  EFW:5.5 lbs    PELVIC EXAM: 1/ 50%/ Posterior/ average/ -2    PRESENTATION: VERTEX  BLOOD: yes dark red, small amount in vault and at cervical os  DISCHARGE: bloody    ROM:  undetermined; old blood in vault  POOLING: negative  Ferning: negative  FLUID: none    LABS:  WET PREP, GBS, GC/ CHLAMYDIA, INR, PTT, KB and fibrinogen   Latest Reference Range & Units 24 11:57 24 12:00 24 12:32   Fern Crystallization No ferning present   No ferning present    INR 0.85 - 1.15    0.98   PTT 22 - 38 Seconds   28   Fibrinogen 170 - 490 mg/dL   736 (H)   ABO/Rh(D)    O POS   Antibody Screen Negative    Negative   SPECIMEN EXPIRATION DATE    [id number]   Clue cells Absent  Absent     CHLAMYDIA TRACHOMATIS PCR  Rpt (IP)     GROUP B STREP PCR  Rpt (IP)     NEISSERIA GONORRHOEAE PCR  Rpt (IP)     WET PREPARATION  Rpt !     Trichomonas Absent  Absent     WBCs/high power field None  1+ !     Yeast Absent  Absent     (H): Data is abnormally high  !: Data is abnormal  (IP): In Process  Rpt: View report in Results Review for more information      DIAGNOSIS  ============  35w4d    NST: REACTIVE  CST: NOT DONE  Fetal Heart rate tracing: category one    PLAN  ============  -waiting for the rest of the lab results  -IV fluid bolus for continued uterine activity  -Monitor bleeding      Masoud Segovia CNM

## 2024-05-17 NOTE — DISCHARGE INSTRUCTIONS
Learning About When to Call Your Doctor During Pregnancy (After 20 Weeks)  Overview  It's common to have concerns about what might be a problem when you're pregnant. Most pregnancies don't have any serious problems. But it's still important to know when to call your doctor if you have certain symptoms or signs of labor.  These are general suggestions. Your doctor may give you some more information about when to call.  When to call your doctor (after 20 weeks)  Call 911  anytime you think you may need emergency care. For example, call if:  You have severe vaginal bleeding. This means you are soaking through a pad each hour for 2 or more hours.  You have sudden, severe pain in your belly.  You have chest pain, are short of breath, or cough up blood.  You passed out (lost consciousness).  You have a seizure.  You see or feel the umbilical cord.  You think you are about to deliver your baby and can't make it safely to the hospital or birthing center.  Call your doctor now or seek immediate medical care if:  You have vaginal bleeding.  You have belly pain.  You have a fever.  You are dizzy or lightheaded, or you feel like you may faint.  You have signs of a blood clot in your leg (called a deep vein thrombosis), such as:  Pain in the calf, back of the knee, thigh, or groin.  Swelling in your leg or groin.  A color change on the leg or groin. The skin may be reddish or purplish, depending on your usual skin color.  You have symptoms of preeclampsia, such as:  Sudden swelling of your face, hands, or feet.  New vision problems (such as dimness, blurring, or seeing spots).  A severe headache.  You have a sudden release of fluid from your vagina. (You think your water broke.)  You've been having regular contractions for an hour. This means that you've had at least 6 contractions within 1 hour, even after you change your position and drink fluids.  You notice that your baby has stopped moving or is moving less than  "normal.  You have signs of heart failure, such as:  New or increased shortness of breath.  New or worse swelling in your legs, ankles, or feet.  Sudden weight gain, such as more than 2 to 3 pounds in a day or 5 pounds in a week.  Feeling so tired or weak that you cannot do your usual activities.  You have symptoms of a urinary tract infection. These may include:  Pain or burning when you urinate.  A frequent need to urinate without being able to pass much urine.  Pain in the flank, which is just below the rib cage and above the waist on either side of the back.  Blood in your urine.  Watch closely for changes in your health, and be sure to contact your doctor if:  You have vaginal discharge that smells bad.  You feel sad, anxious, or hopeless for more than a few days.  You have skin changes, such as a rash, itching, or a yellow color to your skin.  You have other concerns about your pregnancy.  If you have labor signs at 37 weeks or more  If you have signs of labor at 37 weeks or more, your doctor may tell you to call when your labor becomes more active. Symptoms of active labor include:  Contractions that are regular.  Contractions that are less than 5 minutes apart.  Contractions that are hard to talk through.  Follow-up care is a key part of your treatment and safety. Be sure to make and go to all appointments, and call your doctor if you are having problems. It's also a good idea to know your test results and keep a list of the medicines you take.  Where can you learn more?  Go to https://www.[employer].net/patiented  Enter N531 in the search box to learn more about \"Learning About When to Call Your Doctor During Pregnancy (After 20 Weeks).\"  Current as of: July 10, 2023               Content Version: 14.0    9075-7100 [employer].   Care instructions adapted under license by your healthcare professional. If you have questions about a medical condition or this instruction, always ask your healthcare " professional. [employer] disclaims any warranty or liability for your use of this information.

## 2024-05-17 NOTE — PROVIDER NOTIFICATION
05/17/24 1030   Provider Notification   Provider Name/Title Masoud Segovia CNM   Method of Notification Electronic Page   Request Evaluate in Person   Notification Reason Patient Arrived     Provider notified for rule out rupture and bleeding. Pt states she woke up from a nap around 0900 leaking fluid with a tangerine size amount of blood on underwear. Denies contractions. VSS, assessment WDL. Pt placed on FHR monitors and toco. FHR reactive. Provider will come assess and perform speculum exam.

## 2024-05-17 NOTE — PROGRESS NOTES
S:Chasity continues to have contractions on the monitor, she feels them now but they aren't painful. Her abdomen is generally still not tender, just pressure with palpation of the uterus.     O:  Blood pressure 136/83, temperature 98.1  F (36.7  C), temperature source Oral, resp. rate 16, last menstrual period 2023.  General appearance: comfortable    CONTACTIONS: Contractions every 1.5-5 minutes with irritability .  Palpate: mild  FETAL HEART TONES: baseline 135 with moderate FHR variability and    accelerations yes. Decelerations no.      ROM: not ruptured  PELVIC EXAM: PELVIC EXAM: 1/ 50%/ Posterior/ average/ -2   Fetal Position:  cephalic  Bloody show:  dark brown discharge with SVE    ASSESSMENT:  ==============  IUP @ 35w4d    contractions  Vaginal bleeding in 3rd trimester   Fetal Heart rate tracing category one  GBS- unknown     PLAN:  ===========  -Waiting for KB result and fluid bolus to finish  -Plan to consult with Dr. Downey to see if she has any further recommendations.  -Continue to monitor for now.    Masoud Segovia CNM

## 2024-05-17 NOTE — DISCHARGE SUMMARY
Pt lab results all WDL. Denies feeling contractions or leaking any fluid at this time. No new bright red bleeding present. FHR reactive. Pt will discharge home at this time and follow up in clinic. Pt agreeable to plan. Reviewed with pt signs and symptoms of when to return. Pt discharged to home/self.

## 2024-05-18 LAB
C TRACH DNA SPEC QL NAA+PROBE: NEGATIVE
GP B STREP DNA SPEC QL NAA+PROBE: NEGATIVE
N GONORRHOEA DNA SPEC QL NAA+PROBE: NEGATIVE

## 2024-05-18 NOTE — TELEPHONE ENCOUNTER
FUTURE VISIT INFORMATION      FUTURE VISIT INFORMATION:  Date: 5.20.24  Time: 2:45  [place] [facility] [place]  REFERRAL INFORMATION:  Referring provider:  Obi  Referring providers clinic:  Midwife  Reason for visit/diagnosis  Penicillin allergy [Z88.0], Allergy - Drug, per pt Chasity, referred by Camila Kim MD in [facility], MR in [facility]. [place]      RECORDS REQUESTED FROM:       Clinic name Comments Records Status Imaging Status   Midwife 5.9.24  Obi [facility]    OB/Gyn 11.27.23  Judy Sampson

## 2024-05-20 ENCOUNTER — PRE VISIT (OUTPATIENT)
Dept: ALLERGY | Facility: CLINIC | Age: 29
End: 2024-05-20

## 2024-05-20 ENCOUNTER — OFFICE VISIT (OUTPATIENT)
Dept: ALLERGY | Facility: CLINIC | Age: 29
End: 2024-05-20
Payer: COMMERCIAL

## 2024-05-20 DIAGNOSIS — Z88.0 HISTORY OF PENICILLIN ALLERGY: ICD-10-CM

## 2024-05-20 DIAGNOSIS — Z88.9 DRUG ALLERGY: Primary | ICD-10-CM

## 2024-05-20 DIAGNOSIS — Z88.0 PENICILLIN ALLERGY: ICD-10-CM

## 2024-05-20 PROCEDURE — 95018 ALL TSTG PERQ&IQ DRUGS/BIOL: CPT | Performed by: DERMATOLOGY

## 2024-05-20 PROCEDURE — 99203 OFFICE O/P NEW LOW 30 MIN: CPT | Mod: 25 | Performed by: DERMATOLOGY

## 2024-05-20 PROCEDURE — 95044 PATCH/APPLICATION TESTS: CPT | Performed by: DERMATOLOGY

## 2024-05-20 NOTE — PROGRESS NOTES
[facility] Dermato-allergology Note  Office visit  Encounter Date: May 20, 2024  ____________________________________________    CC: Allergy Consult (Z88.0 (ICD-10-CM) - History of penicillin allergy, Per pt no antihistamines taken.)      HPI:  (May 20, 2024)  Ms. Christi Barrientos is a(n) 28 year old female who presents today as new patient for allergy consultation  - Referred by Dr. Camila Kim (OB/GYN) for possible PCN allergy  - Currently 36 weeks pregnant, with due date 24; her first child  - She was told as a child that she is allergic to PCN, and does not have many details  - Confirm NKDA  - Tonsils removed due to snoring; no seasonal allergy issues until after tonsillectomy, and then has problems in March/April and entire summer until  starts  - Possible allergy testing in childhood  - Otherwise feeling well in usual state of health    Physical Exam:  General: In no acute distress, well-developed, well-nourished  Eyes: no conjunctivitis  ENT: no signs of rhinitis   Pulmonary: no wheezing or coughing  Skin: Focused examination of the skin on test sites was performed = see test results below    Past Medical History:   Patient Active Problem List   Diagnosis    Depression with anxiety    Need for Tdap vaccination    Vaginal bleeding in pregnancy, third trimester     uterine contractions in third trimester, antepartum     No past medical history on file.    Allergies:  No Active Allergies      Medications:  Current Outpatient Medications   Medication Sig Dispense Refill    Ascorbic Acid (VITAMIN C) 500 MG CAPS       FERROUS SULFATE PO Take 60 mg by mouth daily      fluticasone (FLONASE) 50 MCG/ACT nasal spray Spray 1 spray into both nostrils daily      hydrOXYzine (ATARAX) 25 MG tablet       loratadine (CLARITIN) 10 MG tablet Take 10 mg by mouth daily      Misc. Devices (BREAST PUMP) MISC 1 each as needed (for expressing breast milk) 1 each 0    Prenatal Vit-DSS-Fe Cbn-FA  (PRENATAL AD PO)       sertraline (ZOLOFT) 50 MG tablet       Vitamin D3 (CHOLECALCIFEROL) 25 mcg (1000 units) tablet Take by mouth daily       No current facility-administered medications for this visit.       Social History:  The patient works as an . Patient has the following hobbies or non-occupational exposure: gardening.    Family History:  Family History   Problem Relation Age of Onset    Leukemia Mother 41    Diabetes Type 2  Paternal Grandmother     Diabetes Type 2  Paternal Grandfather     No Known Problems Brother     No Known Problems Brother     No Known Problems Brother     No Known Problems Sister     Diabetes Type 2  Paternal Aunt     Diabetes Paternal Uncle     Diabetes Type 2  Paternal Uncle        Previous Labs, Allergy Tests, Dermatopathology, [employer] Alyssia Crocker CNM (Midwife)  FROM NOTE:  Last appointment 6 weeks ago, unintentional lapse in care. Discussed continuing care with CNM team OR OB/GYN team. Questions answered and patient will continue with CNM service.   Encouraged to make appointment with allergy MD for h/o PCN allergy as child     04 Dr. Patrice Dotson ([facility] allergy)  FROM HPI:  The patient has an approximately two year history of nasal congestion, drippy nose. Some degree of nasal and eye itching. At times what her mother calls a wet cough, but on the other hand she thinks that it is due to postnasal drainage. There is no shortness of breath, there is no wheezing. There has been no clear pattern, although it may be worse over the winter. It is an off and on sort of process, it has been worse over the last three months. No clear evident triggers. Then there is the superimposed episode on . See the urgent care note. Mom describes that Christi had been outdoors, not for very long. She took a nap. Took some Benadryl before the nap because she had not taken her typical Alavert that morning, and when she awakened from that she had marked facial and neck  swelling. The urgent care note describes that there was no clearly objective swelling. She was given Epinephrine, monitored and discharged. They have two EpiPens. Nothing unusual transpired that day. She probably had a peanut  butter sandwich for lunch, which would have been probably a few hours prior. There is no history of prior reaction to peanuts or peanut butter. No specific history of food allergies. History of enlarged tonsils. Upset stomach when she has strep. No urticaria. No eczema. No stinging insect allergy. No complaints of headaches. Review of systems said to be otherwise unremarkable. History of hives from penicillin. Father had hay fever symptoms and adverse reactivity to cat fish. Mother has nasal, sinus congestion and is said to be allergic to dust mites. Also said to be penicillin allergic, as are two older siblings. 6-year-old house, where they have resided for that period of time. Forced air heating, central air conditioning. Carpeting in bedroom, non-feather pillow. Four cats, one dog, fish. Five house plants. Both parents are RN's. No smokers in the household.      FROM A&P:  History of episode of angioedema, presumed allergic response/ Allergic rhinitis/ Allergic conjunctivitis/ Cough   However to what she was responding is unclear. I certainly agree with the mom that it seems unlikely in a general sense to be due to Benadryl, but on the other hand, we have nothing else to specifically incriminate. Since there are many alternatives, she is responding to the Alavert, I recommended they avoid it. Mom is familiar with dust mite measures, which we discussed as well. We mutually agree that doing intradermals on Lierin likely would be an ordeal, so such testing was deferred. I have left  follow-up here open ended. Mom will feel free to bring Lierin back for further questions or difficulties. Stated their understanding and agreement with the treatment plan. Would like to add that in reviewing  the  medical record, for history of snoring, lateral view of the neck was done last November, and was read as moderate in size. ADDENDUM: I would presume her cough is on the basis of postnasal drip, as by further review, it is really more of a throat clearing sort of situation.     11/27/23 Dr. Camila Kim MD (OB/GYN)  FROM [facility]:  Hx of penicillin allergy since childhood. Unsure of reaction, but maybe a rash or hives?    Referred By: Camila Kim MD (OB/GYN)  [address] [facility] [address]  [place] [address]     Allergy Tests:  6/7/04 Dr. Patrice Dotson ( allergy)  Skin test by puncture method (last dose of Alavert was three days) - there appeared to  be a positive response to house dust mite - D.P., with a wheal, but not a lot of erythema, with Histamine control similar in size but some surrounding erythema. No reactivity to cat, dog, dust mite - D.F., six trees, two grasses, short and giant ragweed, three molds as well as peanut.     Order for Future Allergy Testing:    [x] Outpatient  [] Inpatient: [facility]..../ Bed ....       Skin Atopy (atopic dermatitis) [] Yes   [x] No .........  Contact allergies:   [x] Yes   [] No ..........nickel  Hand eczema:   [] Yes   [x] No           Leading hand:   [] R   [] L       [] Ambidextrous         Drug allergies:        [x] Yes   [] No  which?......see [facility]    Urticaria/Angioedema  [] Yes   [x] No .........  Food Allergy:  [] Yes   [x] No  which?......  Pets :  [x] Yes   [] No  which?......1 cat and 1 dog; around cat, after she sits on her face, gets stuffy nose         [x]  Rhinitis   [x] Conjunctivitis   [] Sinusitis   [] Polyposis   [] Otitis   [] Pharyngitis         [x]  Postnasal drip    []  none  Operations:   [x] Tonsils (2020   [] Septum   [] Sinus   [] Polyposis        [] Asthma bronchiale   [] Coughing      [x]  none  Symptoms (mostly Rhinoconjunctivitis and Asthma) aggravated by:  Season   [] I   [] II   [x] III   [x] IV   []V   [x]VI   [x]VII   [x]VIII   [x]IX    []X   []XI   []XII     [] perennial   Day time      [] morning   [] noon      [] evening        [] night    [] whole day........  [x]  none  Location/changes    [] inside        [x] outside   [] mountains    [] sea     [] others.............   []  none  Triggers, specific     [] animals     [] plants     [] dust              [] others ...........................    [x]  none  Triggers, others       [] work          [] psyche    [] sport            [] others .............................  [x]  none  Irritant                [] phys efforts [] smoke    [] heat/cold     [] odors  []others............... [x]  none    Order for PATCH TESTS  Reason for tests (suspected allergy):  not necessary at this time  Known previous allergies: nickel  Standardized panels  [] Standard panel (40 tests)  [] Preservatives & Antimicrobials (31 tests)  [] Emulsifiers & Additives (25 tests)   [] Perfumes/Flavours & Plants (25 tests)  [] Hairdresser panel (12 tests)  [] Rubber Chemicals (22 tests)  [] Plastics (26 tests)  [] Colorants/Dyes/Food additives (20 tests)  [] Metals (implants/dental) (24 tests)  [] Local anaesthetics/NSAIDs (13 tests)  [] Antibiotics & Antimycotics (14 tests)   [] Corticosteroids (15 tests)   [] Photopatch test (62 tests)   [] others: ...      [] Patient's own products: ...  DO NOT test if chemical or biological identity is unknown!   always ask from patient the product information and safety sheets (MSDS)       Order for PRICK TESTS    Reason for tests (suspected allergy): may perform in future for seasonal RC and PND  Known previous allergies: see prior records section (6/7/04 Dr. Patrice Dotson ( allergy))    Standardized prick panels  [] Atopic panel (20 tests)  [] Pediatric Panel (12 tests)  [] Milk, Meat, Eggs, Grains (20 tests)   [] Dust, Epithelia, Feathers (10 tests)  [] Fish, Seafood, Shellfish (17 tests)  [] Nuts, Beans (14 tests)  [] Spice, Vegetable, Fruit (17 tests)  [] Pollen Panel = Tree, Grass,  Weed (24 tests)  [] Others: ...      [] Patient's own products: ...  DO NOT test if chemical or biological identity is unknown!   always ask from patient the product information and safety sheets (MSDS)     Standardized intradermal tests  [] Penicillium notatum [] Aspergillus fumigatus [] House dust mites D.far & D. pteron  [] Cat    [] dog  [] Others: ...  [] Bee venom   [] Wasp venom  !!Specific protocol with dilutions!!       Order for Drug allergy tests (prick & Intradermal & patch tests)    [x] Penicillin G  [x] Ampicillin [] Cefazolin   [] Ceftriaxone   [] Ceftazidime  [] Bactrim    [] Others: ...  Order for ... as test date    [] Patient needs consultation with Allergy team (changes of tests may apply)  [] Tests discussed with Allergy team (can have direct appointment for allergy tests)     DRUG ALLERGY TEST SERIES May 20, 2024      Controls Supplier [facility]cen Result (15min) Remarks   Pos Histamine Hydrochloride KATHYA-Dexter B091 1 mg/ml +/++    Neg NaCl Allergopharma 0.9%         Prick Tests         Substance/ Allergen Conc Result (20 min) Remarks    Benzylpenicillin (Penicillin G) 1 Veto IU/ml (600 mg) -     Ampicillin 100mg/ml -       Intradermal Tests   immed immed delay delay      Substance Conc 1st dil  2nd dil  2 days  4 days remarks    Benzylpenicillin (Penicillin G) 1:100 -        Ampicillin 1:10 -           Patch Tests  as is as is 1:2 1:2     As Is  Vas Substance Conc 2 days 4 days 2 days 4 days remarks   1 2 Benzylpenicillin (Penicillin G) 1 [place] IU/ml (600 mg)        3 4 Ampicillin 100 mg/ml            Assessment & Plan:    ==> Final Diagnosis:     # Atopic predisposition with:  Seasonal RC and PND from April to September  Nickel allergy  * stable chronic illness    # Ruling out PCN allergy with unknown reaction in childhood  For completed prick, intradermal, and patch tests, there were no signs for specific immediate or delayed type reactions to penicillin G and ampicillin.     These conclusions are  made at the best of one's knowledge and belief based on the provided evidence such as patient's history and allergy test results and they can change over time or can be incomplete because of missing information.    ==> Treatment Plan:    >> For completed prick, intradermal, and patch tests, there were no signs for specific immediate or delayed type reactions to penicillin G and ampicillin.   ==> Can be used if necessary. For 1st initial dose, keep patient in clinic for observation for 30 minutes.  - Epic allergy list updated.    >> May consider prick tests after she has given birth if seasonal RC and PND persist.    Procedures Performed: Drug allergy tests    Staff and Scribe:  Provider    Scribe Disclosure:   I, OLEG LORA, am serving as a scribe to document services personally performed by Azar Lynn MD based on data collection and the provider's statements to me.     Staff Physician Comments:  I was present with the scribe who participated in the documentation of the note. I have verified the history and personally performed the physical exam and medical decision making. I agree with the assessment and plan as documented in the note. I have reviewed and if necessary amended the note.      Azar Lynn MD  Professor  Head of Dermato-Allergy Division  Department of Dermatology  [facility]    Follow-up in Derm-Allergy clinic if she would like to proceed with prick tests; otherwise, PRN    I spent a total of 30 minutes with Christi Barrientos. This time was spent counseling the patient and/or coordinating care, explaining the allergy tests, performing allergy tests and assessing the clinical relevance.

## 2024-05-20 NOTE — LETTER
2024         RE: Christi Barrientos  [address]  [facility] [address]        Dear Colleague,    Thank you for referring your patient, Christi Barrientos, to the [facility] [place]. Please see a copy of my visit note below.    [facility] Dermato-allergology Note  Office visit  Encounter Date: May 20, 2024  ____________________________________________    CC: Allergy Consult (Z88.0 (ICD-10-CM) - History of penicillin allergy, Per pt no antihistamines taken.)      HPI:  (May 20, 2024)  Ms. Christi Barrientos is a(n) 28 year old female who presents today as new patient for allergy consultation  - Referred by Dr. Camila Kim (OB/GYN) for possible PCN allergy  - Currently 36 weeks pregnant, with due date 24; her first child  - She was told as a child that she is allergic to PCN, and does not have many details  - Confirm NKDA  - Tonsils removed due to snoring; no seasonal allergy issues until after tonsillectomy, and then has problems in March/April and entire summer until fall starts  - Possible allergy testing in childhood  - Otherwise feeling well in usual state of health    Physical Exam:  General: In no acute distress, well-developed, well-nourished  Eyes: no conjunctivitis  ENT: no signs of rhinitis   Pulmonary: no wheezing or coughing  Skin: Focused examination of the skin on test sites was performed = see test results below    Past Medical History:   Patient Active Problem List   Diagnosis     Depression with anxiety     Need for Tdap vaccination     Vaginal bleeding in pregnancy, third trimester      uterine contractions in third trimester, antepartum     No past medical history on file.    Allergies:  No Active Allergies      Medications:  Current Outpatient Medications   Medication Sig Dispense Refill     Ascorbic Acid (VITAMIN C) 500 MG CAPS        FERROUS SULFATE PO Take 60 mg by mouth daily       fluticasone (FLONASE) 50 MCG/ACT nasal  spray Spray 1 spray into both nostrils daily       hydrOXYzine (ATARAX) 25 MG tablet        loratadine (CLARITIN) 10 MG tablet Take 10 mg by mouth daily       Misc. Devices (BREAST PUMP) MISC 1 each as needed (for expressing breast milk) 1 each 0     Prenatal Vit-DSS-Fe Cbn-FA (PRENATAL AD PO)        sertraline (ZOLOFT) 50 MG tablet        Vitamin D3 (CHOLECALCIFEROL) 25 mcg (1000 units) tablet Take by mouth daily       No current facility-administered medications for this visit.       Social History:  The patient works as an . Patient has the following hobbies or non-occupational exposure: gardening.    Family History:  Family History   Problem Relation Age of Onset     Leukemia Mother 41     Diabetes Type 2  Paternal Grandmother      Diabetes Type 2  Paternal Grandfather      No Known Problems Brother      No Known Problems Brother      No Known Problems Brother      No Known Problems Sister      Diabetes Type 2  Paternal Aunt      Diabetes Paternal Uncle      Diabetes Type 2  Paternal Uncle        Previous Labs, Allergy Tests, Dermatopathology, Imagin[employer] Alyssia Crocker CNM (Midwife)  FROM NOTE:  Last appointment 6 weeks ago, unintentional lapse in care. Discussed continuing care with CNM team OR OB/GYN team. Questions answered and patient will continue with CNM service.   Encouraged to make appointment with allergy MD for h/o PCN allergy as child     04 Dr. Patrice Dotson ( allergy)  FROM [facility]:  The patient has an approximately two year history of nasal congestion, drippy nose. Some degree of nasal and eye itching. At times what her mother calls a wet cough, but on the other hand she thinks that it is due to postnasal drainage. There is no shortness of breath, there is no wheezing. There has been no clear pattern, although it may be worse over the winter. It is an off and on sort of process, it has been worse over the last three months. No clear evident triggers. Then there is the  superimposed episode on June 1st. See the urgent care note. Mom describes that Christi had been outdoors, not for very long. She took a nap. Took some Benadryl before the nap because she had not taken her typical Alavert that morning, and when she awakened from that she had marked facial and neck swelling. The urgent care note describes that there was no clearly objective swelling. She was given Epinephrine, monitored and discharged. They have two EpiPens. Nothing unusual transpired that day. She probably had a peanut  butter sandwich for lunch, which would have been probably a few hours prior. There is no history of prior reaction to peanuts or peanut butter. No specific history of food allergies. History of enlarged tonsils. Upset stomach when she has strep. No urticaria. No eczema. No stinging insect allergy. No complaints of headaches. Review of systems said to be otherwise unremarkable. History of hives from penicillin. Father had hay fever symptoms and adverse reactivity to cat fish. Mother has nasal, sinus congestion and is said to be allergic to dust mites. Also said to be penicillin allergic, as are two older siblings. 6-year-old house, where they have resided for that period of time. Forced air heating, central air conditioning. Carpeting in bedroom, non-feather pillow. Four cats, one dog, fish. Five house plants. Both parents are RN's. No smokers in the household.      FROM A&P:  History of episode of angioedema, presumed allergic response/ Allergic rhinitis/ Allergic conjunctivitis/ Cough   However to what she was responding is unclear. I certainly agree with the mom that it seems unlikely in a general sense to be due to Benadryl, but on the other hand, we have nothing else to specifically incriminate. Since there are many alternatives, she is responding to the Alavert, I recommended they avoid it. Mom is familiar with dust mite measures, which we discussed as well. We mutually agree that doing  intradermals on Christi likely would be an ordeal, so such testing was deferred. I have left  follow-up here open ended. Mom will feel free to bring Christi back for further questions or difficulties. Stated their understanding and agreement with the treatment plan. Would like to add that in reviewing the  medical record, for history of snoring, lateral view of the neck was done last November, and was read as moderate in size. ADDENDUM: I would presume her cough is on the basis of postnasal drip, as by further review, it is really more of a throat clearing sort of situation.     11/27/23 Dr. Camila Kim MD (OB/GYN)  FROM [facility]:  Hx of penicillin allergy since childhood. Unsure of reaction, but maybe a rash or hives?    Referred By: Camila Kim MD (OB/GYN)  [address]  [place] [address]     Allergy Tests:  6/7/04 Dr. Patrice Dotson ( allergy)  Skin test by puncture method (last dose of Alavert was three days) - there appeared to  be a positive response to house dust mite - D.P., with a wheal, but not a lot of erythema, with Histamine control similar in size but some surrounding erythema. No reactivity to cat, dog, dust mite - D.F., six trees, two grasses, short and giant ragweed, three molds as well as peanut.     Order for Future Allergy Testing:    [x] Outpatient  [] Inpatient: [facility]..../ Bed ....       Skin Atopy (atopic dermatitis) [] Yes   [x] No .........  Contact allergies:   [x] Yes   [] No ..........nickel  Hand eczema:   [] Yes   [x] No           Leading hand:   [] R   [] L       [] Ambidextrous         Drug allergies:        [x] Yes   [] No  which?......see HPI    Urticaria/Angioedema  [] Yes   [x] No .........  Food Allergy:  [] Yes   [x] No  which?......  Pets :  [x] Yes   [] No  which?......1 cat and 1 dog; around cat, after she sits on her face, gets stuffy nose         [x]  Rhinitis   [x] Conjunctivitis   [] Sinusitis   [] Polyposis   [] Otitis   [] Pharyngitis         [x]   Postnasal drip    []  none  Operations:   [x] Tonsils (2020   [] Septum   [] Sinus   [] Polyposis        [] Asthma bronchiale   [] Coughing      [x]  none  Symptoms (mostly Rhinoconjunctivitis and Asthma) aggravated by:  Season   [] I   [] II   [x] III   [x] IV   []V   [x]VI   [x]VII   [x]VIII   [x]IX   []X   []XI   []XII     [] perennial   Day time      [] morning   [] noon      [] evening        [] night    [] whole day........  [x]  none  Location/changes    [] inside        [x] outside   [] mountains    [] sea     [] others.............   []  none  Triggers, specific     [] animals     [] plants     [] dust              [] others ...........................    [x]  none  Triggers, others       [] work          [] psyche    [] sport            [] others .............................  [x]  none  Irritant                [] phys efforts [] smoke    [] heat/cold     [] odors  []others............... [x]  none    Order for PATCH TESTS  Reason for tests (suspected allergy):  not necessary at this time  Known previous allergies: nickel  Standardized panels  [] Standard panel (40 tests)  [] Preservatives & Antimicrobials (31 tests)  [] Emulsifiers & Additives (25 tests)   [] Perfumes/Flavours & Plants (25 tests)  [] Hairdresser panel (12 tests)  [] Rubber Chemicals (22 tests)  [] Plastics (26 tests)  [] Colorants/Dyes/Food additives (20 tests)  [] Metals (implants/dental) (24 tests)  [] Local anaesthetics/NSAIDs (13 tests)  [] Antibiotics & Antimycotics (14 tests)   [] Corticosteroids (15 tests)   [] Photopatch test (62 tests)   [] others: ...      [] Patient's own products: ...  DO NOT test if chemical or biological identity is unknown!   always ask from patient the product information and safety sheets (MSDS)       Order for PRICK TESTS    Reason for tests (suspected allergy): may perform in future for seasonal RC and PND  Known previous allergies: see prior records section (6/7/04 Dr. Patrice Dotson ([facility]  allergy))    Standardized prick panels  [] Atopic panel (20 tests)  [] Pediatric Panel (12 tests)  [] Milk, Meat, Eggs, Grains (20 tests)   [] Dust, Epithelia, Feathers (10 tests)  [] Fish, Seafood, Shellfish (17 tests)  [] Nuts, Beans (14 tests)  [] Spice, Vegetable, Fruit (17 tests)  [] Pollen Panel = Tree, Grass, Weed (24 tests)  [] Others: ...      [] Patient's own products: ...  DO NOT test if chemical or biological identity is unknown!   always ask from patient the product information and safety sheets (MSDS)     Standardized intradermal tests  [] Penicillium notatum [] Aspergillus fumigatus [] House dust mites D.far & D. pteron  [] Cat    [] dog  [] Others: ...  [] Bee venom   [] Wasp venom  !!Specific protocol with dilutions!!       Order for Drug allergy tests (prick & Intradermal & patch tests)    [x] Penicillin G  [x] Ampicillin [] Cefazolin   [] Ceftriaxone   [] Ceftazidime  [] Bactrim    [] Others: ...  Order for ... as test date    [] Patient needs consultation with Allergy team (changes of tests may apply)  [] Tests discussed with Allergy team (can have direct appointment for allergy tests)     DRUG ALLERGY TEST SERIES May 20, 2024      Controls Supplier [employer] Result (15min) Remarks   Pos Histamine Hydrochloride Efraín B091 1 mg/ml +/++    Neg NaCl Allergopharma 0.9%         Prick Tests         Substance/ Allergen Conc Result (20 min) Remarks    Benzylpenicillin (Penicillin G) 1 Veto IU/ml (600 mg) -     Ampicillin 100mg/ml -       Intradermal Tests   immed immed delay delay      Substance Conc 1st dil  2nd dil  2 days  4 days remarks    Benzylpenicillin (Penicillin G) 1:100 -        Ampicillin 1:10 -           Patch Tests  as is as is 1:2 1:2     As Is  Vas Substance Conc 2 days 4 days 2 days 4 days remarks   1 2 Benzylpenicillin (Penicillin G) 1 Veto IU/ml (600 mg)        3 4 Ampicillin 100 mg/ml            Assessment & Plan:    ==> Final Diagnosis:     # Atopic predisposition with:  Seasonal RC  and PND from April to September  Nickel allergy  * stable chronic illness    # Ruling out PCN allergy with unknown reaction in childhood  For completed prick, intradermal, and patch tests, there were no signs for specific immediate or delayed type reactions to penicillin G and ampicillin.     These conclusions are made at the best of one's knowledge and belief based on the provided evidence such as patient's history and allergy test results and they can change over time or can be incomplete because of missing information.    ==> Treatment Plan:    >> For completed prick, intradermal, and patch tests, there were no signs for specific immediate or delayed type reactions to penicillin G and ampicillin.   ==> Can be used if necessary. For 1st initial dose, keep patient in clinic for observation for 30 minutes.  - Epic allergy list updated.    >> May consider prick tests after she has given birth if seasonal RC and PND persist.    Procedures Performed: Drug allergy tests    Staff and Scribe:  Provider    Scribe Disclosure:   I, OLEG LORA, am serving as a scribe to document services personally performed by Azar Lynn MD based on data collection and the provider's statements to me.     Staff Physician Comments:  I was present with the scribe who participated in the documentation of the note. I have verified the history and personally performed the physical exam and medical decision making. I agree with the assessment and plan as documented in the note. I have reviewed and if necessary amended the note.      Azar Lynn MD  Professor  Head of Dermato-Allergy Division  Department of Dermatology  [facility]    Follow-up in Derm-Allergy clinic if she would like to proceed with prick tests; otherwise, PRN    I spent a total of 30 minutes with Christi Barrientos. This time was spent counseling the patient and/or coordinating care, explaining the allergy tests, performing allergy  tests and assessing the clinical relevance.       Again, thank you for allowing me to participate in the care of your patient.        Sincerely,        Azar Lynn MD

## 2024-05-20 NOTE — PATIENT INSTRUCTIONS
Removal of Patch Tests on Day 3:    Remove patches and tape from test area on Wednesday 5/22/24 at anytime          Using the purple surgical markers provided (or other permanent marker), draw a grid around the test area so that the circular indentation is in the center of each square, as below. Try to be as neat as possible and keep the lines as straight as you can (you can use a ruler if you need to)          Redraw the number that was underneath the tape above the grids, as shown below. Try to print clearly.            Photograph the entire area then take close-up photos of any possible reactions. Examples of possible reactions are spots that look like these: TAKE PHOTOS ON Wednesday AND Friday, send them via [employer] Message to Dr. Lynn's team. PLEASE ALSO TAKE PHOTOS OF LEFT FOREARM IF ANY PINK SPOTS APPEAR          Return to clinic for evaluation as instructed/Send photos via [employer] message and team will reach out to you if we need to schedule a follow up. After removing on Wednesday, you should continue to avoid scrubbing the area, exposing the area to UV light, using topical steroids, or scratching.     REMINDER - THE PURPLE MARKER WILL STAIN CLOTHING, BEDDING, FURNITURE, ETC. WEAR A DARK COLORED SHIRT OR SOMETHING THAT YOU DON'T CARE IF IT GETS STAINED UP, UNTIL YOU CAN WASH IT FULLY AFTER THE SECOND SET OF PHOTOS ARE DONE.        Who should I call with questions?  [facility], [place]: [phone]  For urgent needs outside of business hours call the [facility] at [phone] and ask for the dermatology resident on call      If you develop any serious or adverse allergic reaction after office hours please seek immediate medical assistance at the nearest clinic or emergency room

## 2024-05-20 NOTE — NURSING NOTE
Chief Complaint   Patient presents with    Allergy Consult     Z88.0 (ICD-10-CM) - History of penicillin allergy, Per pt no antihistamines taken.     Zakia Reina RN

## 2024-05-23 ENCOUNTER — PRENATAL OFFICE VISIT (OUTPATIENT)
Dept: MIDWIFE SERVICES | Facility: CLINIC | Age: 29
End: 2024-05-23
Payer: COMMERCIAL

## 2024-05-23 VITALS — SYSTOLIC BLOOD PRESSURE: 111 MMHG | HEART RATE: 77 BPM | OXYGEN SATURATION: 97 % | DIASTOLIC BLOOD PRESSURE: 81 MMHG

## 2024-05-23 DIAGNOSIS — Z34.03 ENCOUNTER FOR SUPERVISION OF NORMAL FIRST PREGNANCY IN THIRD TRIMESTER: Primary | ICD-10-CM

## 2024-05-23 LAB
HGB BLD-MCNC: 12 G/DL (ref 11.7–15.7)
HOLD SPECIMEN: NORMAL

## 2024-05-23 PROCEDURE — 36415 COLL VENOUS BLD VENIPUNCTURE: CPT | Performed by: ADVANCED PRACTICE MIDWIFE

## 2024-05-23 PROCEDURE — 99207 PR PRENATAL VISIT: CPT | Performed by: ADVANCED PRACTICE MIDWIFE

## 2024-05-23 PROCEDURE — 87653 STREP B DNA AMP PROBE: CPT | Performed by: ADVANCED PRACTICE MIDWIFE

## 2024-05-23 RX ORDER — PROPRANOLOL HYDROCHLORIDE 10 MG/1
10 TABLET ORAL DAILY
COMMUNITY

## 2024-05-23 ASSESSMENT — ANXIETY QUESTIONNAIRES
3. WORRYING TOO MUCH ABOUT DIFFERENT THINGS: SEVERAL DAYS
IF YOU CHECKED OFF ANY PROBLEMS ON THIS QUESTIONNAIRE, HOW DIFFICULT HAVE THESE PROBLEMS MADE IT FOR YOU TO DO YOUR WORK, TAKE CARE OF THINGS AT HOME, OR GET ALONG WITH OTHER PEOPLE: SOMEWHAT DIFFICULT
1. FEELING NERVOUS, ANXIOUS, OR ON EDGE: MORE THAN HALF THE DAYS
7. FEELING AFRAID AS IF SOMETHING AWFUL MIGHT HAPPEN: SEVERAL DAYS
GAD7 TOTAL SCORE: 9
5. BEING SO RESTLESS THAT IT IS HARD TO SIT STILL: NOT AT ALL
GAD7 TOTAL SCORE: 9
2. NOT BEING ABLE TO STOP OR CONTROL WORRYING: SEVERAL DAYS
6. BECOMING EASILY ANNOYED OR IRRITABLE: MORE THAN HALF THE DAYS

## 2024-05-23 ASSESSMENT — PATIENT HEALTH QUESTIONNAIRE - PHQ9
SUM OF ALL RESPONSES TO PHQ QUESTIONS 1-9: 14
5. POOR APPETITE OR OVEREATING: MORE THAN HALF THE DAYS

## 2024-05-23 NOTE — PROGRESS NOTES
36w3d   Feeling well, [facility] and R/O PROM on 5/17/24.  Since then has continued to loose mucous plug.  Denies contractions, LOVF.  Plans [facility] for peds care.  Plans to breastfeed and plans POPs for PP BC.  Gbs and hgb today.  Met with an allergist last week and does not have a PCN allergy.  RTC 1 wk AIDEN Booker CNM

## 2024-05-24 LAB — GP B STREP DNA SPEC QL NAA+PROBE: NEGATIVE

## 2024-05-30 ENCOUNTER — HOSPITAL ENCOUNTER (OUTPATIENT)
Dept: ULTRASOUND IMAGING | Facility: CLINIC | Age: 29
Discharge: HOME | End: 2024-05-30
Attending: STUDENT IN AN ORGANIZED HEALTH CARE EDUCATION/TRAINING PROGRAM
Payer: COMMERCIAL

## 2024-05-30 ENCOUNTER — OFFICE VISIT (OUTPATIENT)
Dept: MATERNAL FETAL MEDICINE | Facility: CLINIC | Age: 29
End: 2024-05-30
Attending: STUDENT IN AN ORGANIZED HEALTH CARE EDUCATION/TRAINING PROGRAM
Payer: COMMERCIAL

## 2024-05-30 DIAGNOSIS — O99.210 OBESITY IN PREGNANCY: ICD-10-CM

## 2024-05-30 DIAGNOSIS — O35.EXX0 PYELECTASIS OF FETUS ON PRENATAL ULTRASOUND: Primary | ICD-10-CM

## 2024-05-30 PROCEDURE — 76819 FETAL BIOPHYS PROFIL W/O NST: CPT

## 2024-05-30 PROCEDURE — 76819 FETAL BIOPHYS PROFIL W/O NST: CPT | Mod: 26 | Performed by: STUDENT IN AN ORGANIZED HEALTH CARE EDUCATION/TRAINING PROGRAM

## 2024-05-30 NOTE — PROGRESS NOTES
"Please see \"Imaging\" tab under \"Chart Review\" for details of today's visit.    Shanell Gonzalez    "

## 2024-05-30 NOTE — NURSING NOTE
Patient reports positive fetal movement, no pain, no contractions, leaking of fluid, or bleeding.  Education provided to patient on biophysical profile.  SBAR given to DOMINICK CLARK, see their note in Epic.

## 2024-06-06 ENCOUNTER — TELEPHONE (OUTPATIENT)
Dept: MIDWIFE SERVICES | Facility: CLINIC | Age: 29
End: 2024-06-06

## 2024-06-06 ENCOUNTER — HOSPITAL ENCOUNTER (OUTPATIENT)
Dept: ULTRASOUND IMAGING | Facility: CLINIC | Age: 29
Discharge: HOME | End: 2024-06-06
Attending: STUDENT IN AN ORGANIZED HEALTH CARE EDUCATION/TRAINING PROGRAM
Payer: COMMERCIAL

## 2024-06-06 ENCOUNTER — OFFICE VISIT (OUTPATIENT)
Dept: MATERNAL FETAL MEDICINE | Facility: CLINIC | Age: 29
End: 2024-06-06
Attending: STUDENT IN AN ORGANIZED HEALTH CARE EDUCATION/TRAINING PROGRAM
Payer: COMMERCIAL

## 2024-06-06 DIAGNOSIS — O99.210 OBESITY IN PREGNANCY: Primary | ICD-10-CM

## 2024-06-06 PROCEDURE — 76819 FETAL BIOPHYS PROFIL W/O NST: CPT | Mod: 26 | Performed by: OBSTETRICS & GYNECOLOGY

## 2024-06-06 PROCEDURE — 76819 FETAL BIOPHYS PROFIL W/O NST: CPT

## 2024-06-06 NOTE — NURSING NOTE
Pt at [facility] for BPP- see detailed report under imaging tab.  Pt reports occasional contractions, denies bleeding, loss of fluid and other concerns at this time.  Labor signs reviewed with pt.  Encouraged pt to schedule routine OB visit- last seen 5/23.

## 2024-06-06 NOTE — PROGRESS NOTES
The patient was seen for an ultrasound in the Maternal[facility]Fetal Medicine Center at the [facility] today.  For a detailed report of the ultrasound examination, please see the ultrasound report which can be found under the imaging tab.    If you have questions regarding today's evaluation or if we can be of further service, please contact the Maternal-Fetal Medicine Center.    Allie Dillard MD  , OB/GYN  Maternal-Fetal Medicine  [phone] (Pager)

## 2024-06-07 ENCOUNTER — PRENATAL OFFICE VISIT (OUTPATIENT)
Dept: MIDWIFE SERVICES | Facility: CLINIC | Age: 29
End: 2024-06-07
Payer: COMMERCIAL

## 2024-06-07 VITALS
OXYGEN SATURATION: 98 % | WEIGHT: 258.6 LBS | TEMPERATURE: 98.5 F | DIASTOLIC BLOOD PRESSURE: 80 MMHG | RESPIRATION RATE: 20 BRPM | HEART RATE: 69 BPM | SYSTOLIC BLOOD PRESSURE: 125 MMHG | BODY MASS INDEX: 39.9 KG/M2

## 2024-06-07 DIAGNOSIS — Z34.03 ENCOUNTER FOR SUPERVISION OF NORMAL FIRST PREGNANCY IN THIRD TRIMESTER: Primary | ICD-10-CM

## 2024-06-07 PROCEDURE — 99207 PR PRENATAL VISIT: CPT | Performed by: ADVANCED PRACTICE MIDWIFE

## 2024-06-07 NOTE — PROGRESS NOTES
38w4d  Chasity is feeling well, no concerns. Has been pumping and has 5 syringes of colostrum! Baby is active. No regular contractions although she does notice some cramping while pumping. No LOF or bleeding. Chasity is asking about induction, initially didn't think that we could offer prior to 41 weeks but reviewed [facility] guidelines and can offer elective induction at 39 weeks for BMI >30. Called patient after visit to discuss this. She will talk to her  and call back with any questions. Briefly reviewed risks/benefits but only when she was in clinic and IOL wasn't being offered. Would recommend full discussion prior to scheduling IOL if patient desires to do this. RTC in 1 week. MML

## 2024-06-07 NOTE — PROGRESS NOTES
Please see full imaging report from [employer] program under imaging tab.    January Petersen MD  Maternal Fetal Medicine     no

## 2024-06-07 NOTE — PATIENT INSTRUCTIONS
Week 38 of Your Pregnancy: Care Instructions  Believe it or not, your baby is almost here. You may notice how your baby responds to sounds, warmth, cold, and light. You may even know what kind of music your baby likes.    Even if you expect a vaginal birth, it's a good idea to learn about  section ().  is the delivery of a baby through a cut (incision) in your belly. It's a major surgery, so it has more risks than a vaginal birth.    During the first 2 weeks after the birth, limit visitors. Don't allow visitors who have colds or infections. Ask visitors to wash their hands before they touch your baby. And never let anyone smoke around your baby.    Know about unplanned C-sections.  Reasons for an unplanned  include labor that slows or stops, signs of distress in your baby, and high blood pressure or other problems for you.     Know about planned C-sections.  If your baby isn't in a head-down position for delivery (breech position), your doctor may plan a . Or you may have a planned  if you're having twins or more.     Get as much help as you can while you're in the hospital.  You can learn about feeding, diapering, and bathing your baby.     Talk to your doctor or midwife about how to care for the umbilical cord stump.  If your baby will be circumcised, also ask about how to care for that.     Ask friends or family for help, as you need it.  If you can, have another adult in your home for at least 2 or 3 days after the birth.     Try to nap when your baby naps.  This may be your best chance to get some sleep.     Watch for changes in your mental health.  For the first 1 to 2 weeks after birth, it's common to cry or feel sad or irritable. If these feelings last for more than 2 weeks, you may have postpartum depression. Ask your doctor for help. Postpartum depression can be treated.   Follow-up care is a key part of your treatment and safety. Be sure to make and go  "to all appointments, and call your doctor if you are having problems. It's also a good idea to know your test results and keep a list of the medicines you take.  Where can you learn more?  Go to https://www.[employer].net/patiented  Enter B044 in the search box to learn more about \"Week 38 of Your Pregnancy: Care Instructions.\"  Current as of: July 10, 2023               Content Version: 14.0    2541-4648 [employer].   Care instructions adapted under license by your healthcare professional. If you have questions about a medical condition or this instruction, always ask your healthcare professional. [employer] disclaims any warranty or liability for your use of this information.      "

## 2024-06-12 ENCOUNTER — ANESTHESIA EVENT (OUTPATIENT)
Dept: OBGYN | Facility: CLINIC | Age: 29
End: 2024-06-12
Payer: COMMERCIAL

## 2024-06-12 ENCOUNTER — ANESTHESIA (OUTPATIENT)
Dept: OBGYN | Facility: CLINIC | Age: 29
End: 2024-06-12
Payer: COMMERCIAL

## 2024-06-12 ENCOUNTER — HOSPITAL ENCOUNTER (INPATIENT)
Facility: CLINIC | Age: 29
LOS: 4 days | Discharge: HOME HEALTH | End: 2024-06-16
Attending: ADVANCED PRACTICE MIDWIFE | Admitting: ADVANCED PRACTICE MIDWIFE
Payer: COMMERCIAL

## 2024-06-12 DIAGNOSIS — Z30.09 ENCOUNTER FOR OTHER GENERAL COUNSELING OR ADVICE ON CONTRACEPTION: ICD-10-CM

## 2024-06-12 DIAGNOSIS — Z98.891 S/P CESAREAN SECTION: ICD-10-CM

## 2024-06-12 LAB
ABO/RH(D): NORMAL
ALBUMIN MFR UR ELPH: 12.8 MG/DL
ALT SERPL W P-5'-P-CCNC: 10 U/L (ref 0–50)
ANTIBODY SCREEN: NEGATIVE
AST SERPL W P-5'-P-CCNC: 12 U/L (ref 0–45)
CREAT SERPL-MCNC: 0.76 MG/DL (ref 0.51–0.95)
CREAT UR-MCNC: 82 MG/DL
EGFRCR SERPLBLD CKD-EPI 2021: >90 ML/MIN/1.73M2
ERYTHROCYTE [DISTWIDTH] IN BLOOD BY AUTOMATED COUNT: 15.6 % (ref 10–15)
HCT VFR BLD AUTO: 35.8 % (ref 35–47)
HGB BLD-MCNC: 11.8 G/DL (ref 11.7–15.7)
MCH RBC QN AUTO: 27.5 PG (ref 26.5–33)
MCHC RBC AUTO-ENTMCNC: 33 G/DL (ref 31.5–36.5)
MCV RBC AUTO: 83 FL (ref 78–100)
PLATELET # BLD AUTO: 314 10E3/UL (ref 150–450)
PROT/CREAT 24H UR: 0.16 MG/MG CR (ref 0–0.2)
RBC # BLD AUTO: 4.29 10E6/UL (ref 3.8–5.2)
SPECIMEN EXPIRATION DATE: NORMAL
T PALLIDUM AB SER QL: NONREACTIVE
WBC # BLD AUTO: 13.9 10E3/UL (ref 4–11)

## 2024-06-12 PROCEDURE — 84460 ALANINE AMINO (ALT) (SGPT): CPT | Performed by: ADVANCED PRACTICE MIDWIFE

## 2024-06-12 PROCEDURE — 59510 CESAREAN DELIVERY: CPT | Performed by: ANESTHESIOLOGY

## 2024-06-12 PROCEDURE — 999N000127 HC STATISTIC PERIPHERAL IV START W US GUIDANCE

## 2024-06-12 PROCEDURE — 85027 COMPLETE CBC AUTOMATED: CPT | Performed by: ADVANCED PRACTICE MIDWIFE

## 2024-06-12 PROCEDURE — G0463 HOSPITAL OUTPT CLINIC VISIT: HCPCS | Mod: 25

## 2024-06-12 PROCEDURE — 86780 TREPONEMA PALLIDUM: CPT | Performed by: ADVANCED PRACTICE MIDWIFE

## 2024-06-12 PROCEDURE — 999N000285 HC STATISTIC VASC ACCESS LAB DRAW WITH PIV START

## 2024-06-12 PROCEDURE — 36415 COLL VENOUS BLD VENIPUNCTURE: CPT | Performed by: ADVANCED PRACTICE MIDWIFE

## 2024-06-12 PROCEDURE — 00HU33Z INSERTION OF INFUSION DEVICE INTO SPINAL CANAL, PERCUTANEOUS APPROACH: ICD-10-PCS | Performed by: ANESTHESIOLOGY

## 2024-06-12 PROCEDURE — 3E0R3BZ INTRODUCTION OF ANESTHETIC AGENT INTO SPINAL CANAL, PERCUTANEOUS APPROACH: ICD-10-PCS | Performed by: ANESTHESIOLOGY

## 2024-06-12 PROCEDURE — 82565 ASSAY OF CREATININE: CPT | Performed by: ADVANCED PRACTICE MIDWIFE

## 2024-06-12 PROCEDURE — 84450 TRANSFERASE (AST) (SGOT): CPT | Performed by: ADVANCED PRACTICE MIDWIFE

## 2024-06-12 PROCEDURE — 258N000003 HC RX IP 258 OP 636: Mod: JZ | Performed by: ADVANCED PRACTICE MIDWIFE

## 2024-06-12 PROCEDURE — 250N000011 HC RX IP 250 OP 636: Performed by: STUDENT IN AN ORGANIZED HEALTH CARE EDUCATION/TRAINING PROGRAM

## 2024-06-12 PROCEDURE — 120N000002 HC R&B MED SURG/OB UMMC

## 2024-06-12 PROCEDURE — 84156 ASSAY OF PROTEIN URINE: CPT | Performed by: ADVANCED PRACTICE MIDWIFE

## 2024-06-12 PROCEDURE — 86900 BLOOD TYPING SEROLOGIC ABO: CPT | Performed by: ADVANCED PRACTICE MIDWIFE

## 2024-06-12 RX ORDER — MISOPROSTOL 200 UG/1
400 TABLET ORAL
Status: DISCONTINUED | OUTPATIENT
Start: 2024-06-12 | End: 2024-06-13 | Stop reason: HOSPADM

## 2024-06-12 RX ORDER — CITRIC ACID/SODIUM CITRATE 334-500MG
30 SOLUTION, ORAL ORAL
Status: COMPLETED | OUTPATIENT
Start: 2024-06-12 | End: 2024-06-13

## 2024-06-12 RX ORDER — METOCLOPRAMIDE HYDROCHLORIDE 5 MG/ML
10 INJECTION INTRAMUSCULAR; INTRAVENOUS EVERY 6 HOURS PRN
Status: DISCONTINUED | OUTPATIENT
Start: 2024-06-12 | End: 2024-06-13 | Stop reason: HOSPADM

## 2024-06-12 RX ORDER — METOCLOPRAMIDE 10 MG/1
10 TABLET ORAL EVERY 6 HOURS PRN
Status: DISCONTINUED | OUTPATIENT
Start: 2024-06-12 | End: 2024-06-13 | Stop reason: HOSPADM

## 2024-06-12 RX ORDER — FENTANYL CITRATE-0.9 % NACL/PF 10 MCG/ML
100 PLASTIC BAG, INJECTION (ML) INTRAVENOUS EVERY 5 MIN PRN
Status: DISCONTINUED | OUTPATIENT
Start: 2024-06-12 | End: 2024-06-13 | Stop reason: HOSPADM

## 2024-06-12 RX ORDER — PROCHLORPERAZINE 25 MG
25 SUPPOSITORY, RECTAL RECTAL EVERY 12 HOURS PRN
Status: DISCONTINUED | OUTPATIENT
Start: 2024-06-12 | End: 2024-06-13 | Stop reason: HOSPADM

## 2024-06-12 RX ORDER — SERTRALINE HYDROCHLORIDE 100 MG/1
100 TABLET, FILM COATED ORAL DAILY
Status: DISCONTINUED | OUTPATIENT
Start: 2024-06-13 | End: 2024-06-16 | Stop reason: HOSPADM

## 2024-06-12 RX ORDER — CARBOPROST TROMETHAMINE 250 UG/ML
250 INJECTION, SOLUTION INTRAMUSCULAR
Status: DISCONTINUED | OUTPATIENT
Start: 2024-06-12 | End: 2024-06-13 | Stop reason: HOSPADM

## 2024-06-12 RX ORDER — NALOXONE HYDROCHLORIDE 0.4 MG/ML
0.2 INJECTION, SOLUTION INTRAMUSCULAR; INTRAVENOUS; SUBCUTANEOUS
Status: DISCONTINUED | OUTPATIENT
Start: 2024-06-12 | End: 2024-06-13 | Stop reason: HOSPADM

## 2024-06-12 RX ORDER — OXYTOCIN 10 [USP'U]/ML
10 INJECTION, SOLUTION INTRAMUSCULAR; INTRAVENOUS
Status: DISCONTINUED | OUTPATIENT
Start: 2024-06-12 | End: 2024-06-16 | Stop reason: HOSPADM

## 2024-06-12 RX ORDER — ACETAMINOPHEN 325 MG/1
650 TABLET ORAL EVERY 4 HOURS PRN
Status: DISCONTINUED | OUTPATIENT
Start: 2024-06-12 | End: 2024-06-13 | Stop reason: HOSPADM

## 2024-06-12 RX ORDER — OXYTOCIN 10 [USP'U]/ML
10 INJECTION, SOLUTION INTRAMUSCULAR; INTRAVENOUS
Status: DISCONTINUED | OUTPATIENT
Start: 2024-06-12 | End: 2024-06-13 | Stop reason: HOSPADM

## 2024-06-12 RX ORDER — TRANEXAMIC ACID 10 MG/ML
1 INJECTION, SOLUTION INTRAVENOUS EVERY 30 MIN PRN
Status: DISCONTINUED | OUTPATIENT
Start: 2024-06-12 | End: 2024-06-13 | Stop reason: HOSPADM

## 2024-06-12 RX ORDER — OXYTOCIN/0.9 % SODIUM CHLORIDE 30/500 ML
100-340 PLASTIC BAG, INJECTION (ML) INTRAVENOUS CONTINUOUS PRN
Status: DISCONTINUED | OUTPATIENT
Start: 2024-06-12 | End: 2024-06-16 | Stop reason: HOSPADM

## 2024-06-12 RX ORDER — ONDANSETRON 2 MG/ML
4 INJECTION INTRAMUSCULAR; INTRAVENOUS EVERY 6 HOURS PRN
Status: DISCONTINUED | OUTPATIENT
Start: 2024-06-12 | End: 2024-06-12

## 2024-06-12 RX ORDER — ONDANSETRON 4 MG/1
4 TABLET, ORALLY DISINTEGRATING ORAL EVERY 6 HOURS PRN
Status: DISCONTINUED | OUTPATIENT
Start: 2024-06-12 | End: 2024-06-13 | Stop reason: HOSPADM

## 2024-06-12 RX ORDER — PROPRANOLOL HYDROCHLORIDE 10 MG/1
10 TABLET ORAL DAILY
Status: DISCONTINUED | OUTPATIENT
Start: 2024-06-13 | End: 2024-06-16 | Stop reason: HOSPADM

## 2024-06-12 RX ORDER — LOPERAMIDE HCL 2 MG
4 CAPSULE ORAL
Status: DISCONTINUED | OUTPATIENT
Start: 2024-06-12 | End: 2024-06-13 | Stop reason: HOSPADM

## 2024-06-12 RX ORDER — METHYLERGONOVINE MALEATE 0.2 MG/ML
200 INJECTION INTRAVENOUS
Status: DISCONTINUED | OUTPATIENT
Start: 2024-06-12 | End: 2024-06-13 | Stop reason: HOSPADM

## 2024-06-12 RX ORDER — KETOROLAC TROMETHAMINE 30 MG/ML
30 INJECTION, SOLUTION INTRAMUSCULAR; INTRAVENOUS
Status: DISCONTINUED | OUTPATIENT
Start: 2024-06-12 | End: 2024-06-16 | Stop reason: HOSPADM

## 2024-06-12 RX ORDER — PROCHLORPERAZINE MALEATE 10 MG
10 TABLET ORAL EVERY 6 HOURS PRN
Status: DISCONTINUED | OUTPATIENT
Start: 2024-06-12 | End: 2024-06-13 | Stop reason: HOSPADM

## 2024-06-12 RX ORDER — MISOPROSTOL 200 UG/1
800 TABLET ORAL
Status: DISCONTINUED | OUTPATIENT
Start: 2024-06-12 | End: 2024-06-13 | Stop reason: HOSPADM

## 2024-06-12 RX ORDER — IBUPROFEN 800 MG/1
800 TABLET, FILM COATED ORAL
Status: DISCONTINUED | OUTPATIENT
Start: 2024-06-12 | End: 2024-06-16 | Stop reason: HOSPADM

## 2024-06-12 RX ORDER — ONDANSETRON 2 MG/ML
4 INJECTION INTRAMUSCULAR; INTRAVENOUS EVERY 6 HOURS PRN
Status: DISCONTINUED | OUTPATIENT
Start: 2024-06-12 | End: 2024-06-13 | Stop reason: HOSPADM

## 2024-06-12 RX ORDER — CITRIC ACID/SODIUM CITRATE 334-500MG
30 SOLUTION, ORAL ORAL ONCE
Status: COMPLETED | OUTPATIENT
Start: 2024-06-12 | End: 2024-06-12

## 2024-06-12 RX ORDER — FENTANYL/ROPIVACAINE/NS/PF 2MCG/ML-.1
PLASTIC BAG, INJECTION (ML) EPIDURAL
Status: DISCONTINUED | OUTPATIENT
Start: 2024-06-12 | End: 2024-06-13

## 2024-06-12 RX ORDER — FENTANYL CITRATE 50 UG/ML
100 INJECTION, SOLUTION INTRAMUSCULAR; INTRAVENOUS
Status: DISCONTINUED | OUTPATIENT
Start: 2024-06-12 | End: 2024-06-13 | Stop reason: HOSPADM

## 2024-06-12 RX ORDER — LIDOCAINE 40 MG/G
CREAM TOPICAL
Status: DISCONTINUED | OUTPATIENT
Start: 2024-06-12 | End: 2024-06-13 | Stop reason: HOSPADM

## 2024-06-12 RX ORDER — NALOXONE HYDROCHLORIDE 0.4 MG/ML
0.4 INJECTION, SOLUTION INTRAMUSCULAR; INTRAVENOUS; SUBCUTANEOUS
Status: DISCONTINUED | OUTPATIENT
Start: 2024-06-12 | End: 2024-06-13 | Stop reason: HOSPADM

## 2024-06-12 RX ORDER — NALBUPHINE HYDROCHLORIDE 20 MG/ML
2.5-5 INJECTION, SOLUTION INTRAMUSCULAR; INTRAVENOUS; SUBCUTANEOUS EVERY 6 HOURS PRN
Status: DISCONTINUED | OUTPATIENT
Start: 2024-06-12 | End: 2024-06-16 | Stop reason: HOSPADM

## 2024-06-12 RX ORDER — ONDANSETRON 4 MG/1
4 TABLET, ORALLY DISINTEGRATING ORAL EVERY 6 HOURS PRN
Status: DISCONTINUED | OUTPATIENT
Start: 2024-06-12 | End: 2024-06-12

## 2024-06-12 RX ORDER — OXYTOCIN/0.9 % SODIUM CHLORIDE 30/500 ML
340 PLASTIC BAG, INJECTION (ML) INTRAVENOUS CONTINUOUS PRN
Status: DISCONTINUED | OUTPATIENT
Start: 2024-06-12 | End: 2024-06-13 | Stop reason: HOSPADM

## 2024-06-12 RX ORDER — LOPERAMIDE HCL 2 MG
2 CAPSULE ORAL
Status: DISCONTINUED | OUTPATIENT
Start: 2024-06-12 | End: 2024-06-13 | Stop reason: HOSPADM

## 2024-06-12 RX ORDER — SODIUM CHLORIDE, SODIUM LACTATE, POTASSIUM CHLORIDE, CALCIUM CHLORIDE 600; 310; 30; 20 MG/100ML; MG/100ML; MG/100ML; MG/100ML
INJECTION, SOLUTION INTRAVENOUS CONTINUOUS
Status: DISCONTINUED | OUTPATIENT
Start: 2024-06-12 | End: 2024-06-13 | Stop reason: HOSPADM

## 2024-06-12 RX ADMIN — SODIUM CHLORIDE, POTASSIUM CHLORIDE, SODIUM LACTATE AND CALCIUM CHLORIDE: 600; 310; 30; 20 INJECTION, SOLUTION INTRAVENOUS at 20:01

## 2024-06-12 RX ADMIN — SODIUM CHLORIDE, POTASSIUM CHLORIDE, SODIUM LACTATE AND CALCIUM CHLORIDE 1000 ML: 600; 310; 30; 20 INJECTION, SOLUTION INTRAVENOUS at 18:59

## 2024-06-12 RX ADMIN — Medication: at 19:25

## 2024-06-12 ASSESSMENT — ACTIVITIES OF DAILY LIVING (ADL)
ADLS_ACUITY_SCORE: 20

## 2024-06-12 NOTE — CONSULTS
"Consult received for Vascular access care.  See LDA for details.  For additional needs place \"Nursing to Consult for Vascular Access\" NUD422 order in EPIC.   "

## 2024-06-12 NOTE — PLAN OF CARE
[facility] for r/o labor and r/o pre-eclampsia. Patient has been forest all day and having some clear mucous discharge. She took her blood pressure at home because she was having contractions and had a couple in the 150s/90s. Declines bleeding and LOF. Reports decrease in fetal movement compared to normal. Patient denies headache, blurry vision, and RUQ pain. All blood pressures WNL, no labs collected. PETER Crespo CNM informed of patient arrival. SVE per provider 1.5/70/-2. Patient desires elective IOL/augmentation, transferred to room 479 for admission.

## 2024-06-12 NOTE — ANESTHESIA PREPROCEDURE EVALUATION
Anesthesia Pre-Procedure Evaluation    Patient: Christi Barrientos   MRN: [id number] : 1995        Procedure :           History reviewed. No pertinent past medical history.   Past Surgical History:   Procedure Laterality Date    TONSILLECTOMY ADULT  2020      No Known Allergies   Social History     Tobacco Use    Smoking status: Never     Passive exposure: Never    Smokeless tobacco: Never   Substance Use Topics    Alcohol use: Not Currently      Wt Readings from Last 1 Encounters:   24 117 kg (258 lb)        Anesthesia Evaluation            ROS/MED HX  ENT/Pulmonary:  - neg pulmonary ROS     Neurologic:  - neg neurologic ROS     Cardiovascular:  - neg cardiovascular ROS     METS/Exercise Tolerance:     Hematologic:  - neg hematologic  ROS     Musculoskeletal:       GI/Hepatic:  - neg GI/hepatic ROS     Renal/Genitourinary:       Endo:  - neg endo ROS     Psychiatric/Substance Use:     (+) psychiatric history anxiety and depression       Infectious Disease:       Malignancy:       Other:               OUTSIDE LABS:  CBC:   Lab Results   Component Value Date    WBC 13.9 (H) 2024    WBC 12.9 (H) 2023    HGB 11.8 2024    HGB 12.0 2024    HCT 35.8 2024    HCT 38.8 2023     2024     2023     BMP:   Lab Results   Component Value Date     2015    POTASSIUM 3.8 2015    CHLORIDE 104 2015    CO2 25 2015    BUN 11 2015    CR 0.79 2015     (H) 2015     COAGS:   Lab Results   Component Value Date    PTT 28 2024    INR 0.98 2024    FIBR 736 (H) 2024     POC:   Lab Results   Component Value Date    HCG Negative 2015     HEPATIC:   Lab Results   Component Value Date    ALBUMIN 4.0 2015    PROTTOTAL 8.5 2015    ALT 18 2015    AST 14 2015    ALKPHOS 100 2015    BILITOTAL 0.4 2015     OTHER:   Lab Results   Component Value Date    A1C 5.7 (H)  11/27/2023    GEENA 9.3 06/13/2015    LIPASE 137 06/13/2015       Anesthesia Plan    ASA Status:  3    NPO Status:  ELEVATED Aspiration Risk/Unknown    Anesthesia Type: Epidural.   Induction: N/a.   Maintenance: N/A.        Consents    Anesthesia Plan(s) and associated risks, benefits, and realistic alternatives discussed. Questions answered and patient/representative(s) expressed understanding.     - Discussed: Risks, Benefits and Alternatives for BOTH SEDATION and the PROCEDURE were discussed     - Discussed with:  Patient            Postoperative Care    Pain management: Neuraxial analgesia.        Comments:           neg OB ROS.      Tom Bellamy MD    I have reviewed the pertinent notes and labs in the chart from the past 30 days and (re)examined the patient.  Any updates or changes from those notes are reflected in this note.

## 2024-06-12 NOTE — H&P
ADMIT NOTE  =================  39w2d  Christi Barrientos is a 28 year old female . Last menstrual period was 2023. Estimated Date of Delivery: 2024. [facility] at 4:28 PM in latent labor. She presented to triage for evaluation of uterine contractions, decreased fetal movement, elevated BPs at home.    Fetal movement- decreased since this morning  ROM- no   GBS- negative    HPI  ==================   Around 1230, Chasity started feeling uterine contractions q3-5 minutes. She notes contractions stopped for about an hour and a half, and then resumed at the same frequency. She had one episode of nausea this morning. She states the nausea and contractions elicited her to check her blood pressure with her home cuff that she has had for a long time, where she reports they were 156/92 and 159/103. She also reports feeling baby move less than normal, feeling some hiccups and limb movements, but overall has only felt baby move about 4 times today, which is concerning to her. Denies vaginal bleeding, rupture of membranes, positive for increased mucus from vagina. She is here for further evaluation after discussion with RN via phone call. She is feeling ready to be done and had discussed with MFM induction at 39 weeks due to BMI 38    CONTRACTIONS: moderate and cramping  ABDOMINAL PAIN: none  FETAL MOVEMENT: decreased since this morning  VAGINAL BLEEDING: none  RUPTURE OF MEMBRANES: no  PELVIC PAIN: None  FOB- is involved, Moo, at bedside.    PRENATAL COURSE  =================  Prenatal course was essentially uncomplicated.    HISTORIES  ============  No Known Allergies  History reviewed. No pertinent past medical history.  Past Surgical History:   Procedure Laterality Date    TONSILLECTOMY ADULT     .  Family History   Problem Relation Age of Onset    Leukemia Mother 41    Diabetes Type 2  Paternal Grandmother     Diabetes Type 2  Paternal Grandfather     No Known Problems  "Brother     No Known Problems Brother     No Known Problems Brother     No Known Problems Sister     Diabetes Type 2  Paternal Aunt     Diabetes Paternal Uncle     Diabetes Type 2  Paternal Uncle      Social History     Tobacco Use    Smoking status: Never     Passive exposure: Never    Smokeless tobacco: Never   Substance Use Topics    Alcohol use: Not Currently     OB History    Para Term  AB Living   1 0 0 0 0 0   SAB IAB Ectopic Multiple Live Births   0 0 0 0 0      # Outcome Date GA Lbr Jose/2nd Weight Sex Type Anes PTL Lv   1 Current                 LABS:   ===========  Rhogam not indicated  ABO: O  RH: POS  RUBELLA AB IG.08  Anti-Treponemia: nonreactive  HIV: nonreactive  Lab Results   Component Value Date    HGB 11.8 2024      Lab Results   Component Value Date    HEPBANG Nonreactive 2023     GBS: Negative  1 hr GCT= 137  3-hr GTT= passed all values    ROS  =========  Pt denies significant respiratory, cardiovacular, GI, or MSK complaints.    See RN data base ROS.     PHYSICAL EXAM:  ===============  /68, resp. Rate 16, height 5'7\", weight 258 lb, LMP 2023  General appearance: uncomfortable with contractions, tolerating well   Neuro: denies headache and visual disturbances  Psych: Mentation normal and bright   Abdomen: gravid, single vertex fetus, non-tender between contractions.  Cephalic confirmed on BSUS, baby in ROP position  EFW-  7.5lbs.   CONTRACTIONS: Contractions every 3-5 minutes.  Palpate: mild  FETAL HEART TONES: baseline 120 with moderate FHR variability and pos accelerations. No decelerations present.      PELVIC EXAM: 1.5/ 75%/ Mid/ soft/ -2   MANCINI SCORE: 8  BLOODY SHOW: no   ROM: No  FLUID: clear and none    ASSESSMENT:  ==============  IUP @ 39w2d in early labor versus prodromal labor.   NST REACTIVE.  Fetal Heart rate tracing category one.  GBS- negative  BPs WNL, no S/S pre-e noted.     PLAN:  ===========  Discussed how normal BPs and reactive NST " are reassuring for both maternal and fetal status. Offered labs to rule out pre-e, declined at this time, she feels like her cuff at home was probably not the right fit and maybe old.   Discussed how contractions are likely sign of early labor. Chasity would like to be admitted for elective induction instead of going home and waiting for labor to progress. IOL secondary to class 2 obesity - BMI 38  Admit - see IP orders  Cervical ripening with cook catheter. Hospital is out of perales catheters.   Anticipate      RAJESH Terry    I was present with the JAUN student who participated in the service and in the documentation of the services provided. I have verified the history and personally performed the physical exam and medical decision making, as documented by the student and edited by me. AIDEN Braun CNM

## 2024-06-13 PROCEDURE — 59510 CESAREAN DELIVERY: CPT | Mod: GC | Performed by: OBSTETRICS & GYNECOLOGY

## 2024-06-13 PROCEDURE — 250N000009 HC RX 250: Performed by: ADVANCED PRACTICE MIDWIFE

## 2024-06-13 PROCEDURE — 120N000002 HC R&B MED SURG/OB UMMC

## 2024-06-13 PROCEDURE — 710N000010 HC RECOVERY PHASE 1, LEVEL 2, PER MIN: Performed by: OBSTETRICS & GYNECOLOGY

## 2024-06-13 PROCEDURE — 272N000001 HC OR GENERAL SUPPLY STERILE: Performed by: OBSTETRICS & GYNECOLOGY

## 2024-06-13 PROCEDURE — 250N000011 HC RX IP 250 OP 636: Performed by: STUDENT IN AN ORGANIZED HEALTH CARE EDUCATION/TRAINING PROGRAM

## 2024-06-13 PROCEDURE — 250N000013 HC RX MED GY IP 250 OP 250 PS 637: Performed by: ADVANCED PRACTICE MIDWIFE

## 2024-06-13 PROCEDURE — 258N000003 HC RX IP 258 OP 636: Mod: JZ

## 2024-06-13 PROCEDURE — 370N000017 HC ANESTHESIA TECHNICAL FEE, PER MIN: Performed by: OBSTETRICS & GYNECOLOGY

## 2024-06-13 PROCEDURE — 250N000009 HC RX 250

## 2024-06-13 PROCEDURE — 250N000011 HC RX IP 250 OP 636: Performed by: ANESTHESIOLOGY

## 2024-06-13 PROCEDURE — 250N000011 HC RX IP 250 OP 636: Mod: JZ

## 2024-06-13 PROCEDURE — 258N000003 HC RX IP 258 OP 636: Mod: JZ | Performed by: ADVANCED PRACTICE MIDWIFE

## 2024-06-13 PROCEDURE — 250N000013 HC RX MED GY IP 250 OP 250 PS 637

## 2024-06-13 PROCEDURE — 250N000011 HC RX IP 250 OP 636: Mod: JW | Performed by: STUDENT IN AN ORGANIZED HEALTH CARE EDUCATION/TRAINING PROGRAM

## 2024-06-13 PROCEDURE — 271N000001 HC OR GENERAL SUPPLY NON-STERILE: Performed by: OBSTETRICS & GYNECOLOGY

## 2024-06-13 PROCEDURE — 360N000076 HC SURGERY LEVEL 3, PER MIN: Performed by: OBSTETRICS & GYNECOLOGY

## 2024-06-13 PROCEDURE — 250N000011 HC RX IP 250 OP 636

## 2024-06-13 PROCEDURE — 250N000011 HC RX IP 250 OP 636: Performed by: ADVANCED PRACTICE MIDWIFE

## 2024-06-13 PROCEDURE — C9290 INJ, BUPIVACAINE LIPOSOME: HCPCS | Performed by: ANESTHESIOLOGY

## 2024-06-13 PROCEDURE — 250N000011 HC RX IP 250 OP 636: Mod: JZ | Performed by: STUDENT IN AN ORGANIZED HEALTH CARE EDUCATION/TRAINING PROGRAM

## 2024-06-13 PROCEDURE — 999N000016 HC STATISTIC ATTENDANCE AT DELIVERY

## 2024-06-13 PROCEDURE — 3E0E87Z INTRODUCTION OF ELECTROLYTIC AND WATER BALANCE SUBSTANCE INTO PRODUCTS OF CONCEPTION, VIA NATURAL OR ARTIFICIAL OPENING ENDOSCOPIC: ICD-10-PCS | Performed by: OBSTETRICS & GYNECOLOGY

## 2024-06-13 RX ORDER — DEXTROSE, SODIUM CHLORIDE, SODIUM LACTATE, POTASSIUM CHLORIDE, AND CALCIUM CHLORIDE 5; .6; .31; .03; .02 G/100ML; G/100ML; G/100ML; G/100ML; G/100ML
INJECTION, SOLUTION INTRAVENOUS CONTINUOUS
Status: DISCONTINUED | OUTPATIENT
Start: 2024-06-13 | End: 2024-06-16 | Stop reason: HOSPADM

## 2024-06-13 RX ORDER — OXYTOCIN/0.9 % SODIUM CHLORIDE 30/500 ML
340 PLASTIC BAG, INJECTION (ML) INTRAVENOUS CONTINUOUS PRN
Status: CANCELLED | OUTPATIENT
Start: 2024-06-13

## 2024-06-13 RX ORDER — LOPERAMIDE HCL 2 MG
2 CAPSULE ORAL
Status: DISCONTINUED | OUTPATIENT
Start: 2024-06-13 | End: 2024-06-16 | Stop reason: HOSPADM

## 2024-06-13 RX ORDER — OXYTOCIN 10 [USP'U]/ML
10 INJECTION, SOLUTION INTRAMUSCULAR; INTRAVENOUS
Status: CANCELLED | OUTPATIENT
Start: 2024-06-13

## 2024-06-13 RX ORDER — LIDOCAINE 40 MG/G
CREAM TOPICAL
Status: DISCONTINUED | OUTPATIENT
Start: 2024-06-13 | End: 2024-06-13 | Stop reason: HOSPADM

## 2024-06-13 RX ORDER — KETOROLAC TROMETHAMINE 30 MG/ML
30 INJECTION, SOLUTION INTRAMUSCULAR; INTRAVENOUS EVERY 6 HOURS
Status: COMPLETED | OUTPATIENT
Start: 2024-06-13 | End: 2024-06-14

## 2024-06-13 RX ORDER — ENOXAPARIN SODIUM 100 MG/ML
40 INJECTION SUBCUTANEOUS EVERY 24 HOURS
Status: DISCONTINUED | OUTPATIENT
Start: 2024-06-14 | End: 2024-06-16 | Stop reason: HOSPADM

## 2024-06-13 RX ORDER — ONDANSETRON 2 MG/ML
4 INJECTION INTRAMUSCULAR; INTRAVENOUS EVERY 6 HOURS PRN
Status: DISCONTINUED | OUTPATIENT
Start: 2024-06-13 | End: 2024-06-16 | Stop reason: HOSPADM

## 2024-06-13 RX ORDER — PROCHLORPERAZINE MALEATE 10 MG
10 TABLET ORAL EVERY 6 HOURS PRN
Status: DISCONTINUED | OUTPATIENT
Start: 2024-06-13 | End: 2024-06-16 | Stop reason: HOSPADM

## 2024-06-13 RX ORDER — SODIUM CHLORIDE 9 MG/ML
INJECTION, SOLUTION INTRAVENOUS CONTINUOUS
Status: DISCONTINUED | OUTPATIENT
Start: 2024-06-13 | End: 2024-06-13 | Stop reason: HOSPADM

## 2024-06-13 RX ORDER — PHYTONADIONE 1 MG/.5ML
INJECTION, EMULSION INTRAMUSCULAR; INTRAVENOUS; SUBCUTANEOUS
Status: DISCONTINUED
Start: 2024-06-13 | End: 2024-06-13 | Stop reason: HOSPADM

## 2024-06-13 RX ORDER — AMOXICILLIN 250 MG
1 CAPSULE ORAL 2 TIMES DAILY
Status: DISCONTINUED | OUTPATIENT
Start: 2024-06-13 | End: 2024-06-16 | Stop reason: HOSPADM

## 2024-06-13 RX ORDER — HYDROCORTISONE 25 MG/G
CREAM TOPICAL 3 TIMES DAILY PRN
Status: DISCONTINUED | OUTPATIENT
Start: 2024-06-13 | End: 2024-06-16 | Stop reason: HOSPADM

## 2024-06-13 RX ORDER — DIPHENHYDRAMINE HCL 25 MG
25 CAPSULE ORAL EVERY 6 HOURS PRN
Status: DISCONTINUED | OUTPATIENT
Start: 2024-06-13 | End: 2024-06-16 | Stop reason: HOSPADM

## 2024-06-13 RX ORDER — CEFAZOLIN SODIUM/WATER 2 G/20 ML
SYRINGE (ML) INTRAVENOUS PRN
Status: DISCONTINUED | OUTPATIENT
Start: 2024-06-13 | End: 2024-06-13

## 2024-06-13 RX ORDER — MISOPROSTOL 200 UG/1
800 TABLET ORAL
Status: DISCONTINUED | OUTPATIENT
Start: 2024-06-13 | End: 2024-06-16 | Stop reason: HOSPADM

## 2024-06-13 RX ORDER — TRANEXAMIC ACID 10 MG/ML
1 INJECTION, SOLUTION INTRAVENOUS EVERY 30 MIN PRN
Status: CANCELLED | OUTPATIENT
Start: 2024-06-13

## 2024-06-13 RX ORDER — PROCHLORPERAZINE 25 MG
25 SUPPOSITORY, RECTAL RECTAL EVERY 12 HOURS PRN
Status: DISCONTINUED | OUTPATIENT
Start: 2024-06-13 | End: 2024-06-16 | Stop reason: HOSPADM

## 2024-06-13 RX ORDER — SODIUM CHLORIDE, SODIUM LACTATE, POTASSIUM CHLORIDE, CALCIUM CHLORIDE 600; 310; 30; 20 MG/100ML; MG/100ML; MG/100ML; MG/100ML
INJECTION, SOLUTION INTRAVENOUS CONTINUOUS PRN
Status: DISCONTINUED | OUTPATIENT
Start: 2024-06-13 | End: 2024-06-13

## 2024-06-13 RX ORDER — MISOPROSTOL 200 UG/1
400 TABLET ORAL
Status: DISCONTINUED | OUTPATIENT
Start: 2024-06-13 | End: 2024-06-16 | Stop reason: HOSPADM

## 2024-06-13 RX ORDER — CARBOPROST TROMETHAMINE 250 UG/ML
250 INJECTION, SOLUTION INTRAMUSCULAR
Status: DISCONTINUED | OUTPATIENT
Start: 2024-06-13 | End: 2024-06-16 | Stop reason: HOSPADM

## 2024-06-13 RX ORDER — IBUPROFEN 800 MG/1
800 TABLET, FILM COATED ORAL EVERY 6 HOURS
Status: DISCONTINUED | OUTPATIENT
Start: 2024-06-14 | End: 2024-06-16 | Stop reason: HOSPADM

## 2024-06-13 RX ORDER — FENTANYL/ROPIVACAINE/NS/PF 2MCG/ML-.1
PLASTIC BAG, INJECTION (ML) EPIDURAL
Status: DISCONTINUED | OUTPATIENT
Start: 2024-06-13 | End: 2024-06-13 | Stop reason: HOSPADM

## 2024-06-13 RX ORDER — AZITHROMYCIN 500 MG/5ML
500 INJECTION, POWDER, LYOPHILIZED, FOR SOLUTION INTRAVENOUS
Status: CANCELLED | OUTPATIENT
Start: 2024-06-13

## 2024-06-13 RX ORDER — MORPHINE SULFATE 1 MG/ML
INJECTION, SOLUTION EPIDURAL; INTRATHECAL; INTRAVENOUS PRN
Status: DISCONTINUED | OUTPATIENT
Start: 2024-06-13 | End: 2024-06-13

## 2024-06-13 RX ORDER — LIDOCAINE 40 MG/G
CREAM TOPICAL
Status: CANCELLED | OUTPATIENT
Start: 2024-06-13

## 2024-06-13 RX ORDER — METOCLOPRAMIDE HYDROCHLORIDE 5 MG/ML
10 INJECTION INTRAMUSCULAR; INTRAVENOUS ONCE
Status: COMPLETED | OUTPATIENT
Start: 2024-06-13 | End: 2024-06-13

## 2024-06-13 RX ORDER — SIMETHICONE 80 MG
80 TABLET,CHEWABLE ORAL 4 TIMES DAILY PRN
Status: DISCONTINUED | OUTPATIENT
Start: 2024-06-13 | End: 2024-06-16 | Stop reason: HOSPADM

## 2024-06-13 RX ORDER — MISOPROSTOL 200 UG/1
400 TABLET ORAL
Status: CANCELLED | OUTPATIENT
Start: 2024-06-13

## 2024-06-13 RX ORDER — OXYTOCIN/0.9 % SODIUM CHLORIDE 30/500 ML
100-340 PLASTIC BAG, INJECTION (ML) INTRAVENOUS CONTINUOUS PRN
Status: CANCELLED | OUTPATIENT
Start: 2024-06-13

## 2024-06-13 RX ORDER — CARBOPROST TROMETHAMINE 250 UG/ML
250 INJECTION, SOLUTION INTRAMUSCULAR
Status: CANCELLED | OUTPATIENT
Start: 2024-06-13

## 2024-06-13 RX ORDER — DEXAMETHASONE SODIUM PHOSPHATE 4 MG/ML
INJECTION, SOLUTION INTRA-ARTICULAR; INTRALESIONAL; INTRAMUSCULAR; INTRAVENOUS; SOFT TISSUE PRN
Status: DISCONTINUED | OUTPATIENT
Start: 2024-06-13 | End: 2024-06-13

## 2024-06-13 RX ORDER — CEFAZOLIN SODIUM/WATER 2 G/20 ML
2 SYRINGE (ML) INTRAVENOUS
Status: CANCELLED | OUTPATIENT
Start: 2024-06-13

## 2024-06-13 RX ORDER — OXYTOCIN 10 [USP'U]/ML
10 INJECTION, SOLUTION INTRAMUSCULAR; INTRAVENOUS
Status: DISCONTINUED | OUTPATIENT
Start: 2024-06-13 | End: 2024-06-16 | Stop reason: HOSPADM

## 2024-06-13 RX ORDER — DEXTROSE, SODIUM CHLORIDE, SODIUM LACTATE, POTASSIUM CHLORIDE, AND CALCIUM CHLORIDE 5; .6; .31; .03; .02 G/100ML; G/100ML; G/100ML; G/100ML; G/100ML
INJECTION, SOLUTION INTRAVENOUS
Status: COMPLETED
Start: 2024-06-13 | End: 2024-06-13

## 2024-06-13 RX ORDER — CALCIUM CARBONATE 500 MG/1
500 TABLET, CHEWABLE ORAL DAILY PRN
Status: DISCONTINUED | OUTPATIENT
Start: 2024-06-13 | End: 2024-06-16 | Stop reason: HOSPADM

## 2024-06-13 RX ORDER — OXYCODONE HYDROCHLORIDE 5 MG/1
5 TABLET ORAL EVERY 4 HOURS PRN
Status: DISCONTINUED | OUTPATIENT
Start: 2024-06-13 | End: 2024-06-16 | Stop reason: HOSPADM

## 2024-06-13 RX ORDER — OXYTOCIN/0.9 % SODIUM CHLORIDE 30/500 ML
340 PLASTIC BAG, INJECTION (ML) INTRAVENOUS CONTINUOUS PRN
Status: DISCONTINUED | OUTPATIENT
Start: 2024-06-13 | End: 2024-06-16 | Stop reason: HOSPADM

## 2024-06-13 RX ORDER — LIDOCAINE HCL/EPINEPHRINE/PF 2%-1:200K
VIAL (ML) INJECTION PRN
Status: DISCONTINUED | OUTPATIENT
Start: 2024-06-13 | End: 2024-06-13

## 2024-06-13 RX ORDER — METHYLERGONOVINE MALEATE 0.2 MG/ML
200 INJECTION INTRAVENOUS
Status: DISCONTINUED | OUTPATIENT
Start: 2024-06-13 | End: 2024-06-16 | Stop reason: HOSPADM

## 2024-06-13 RX ORDER — MODIFIED LANOLIN
OINTMENT (GRAM) TOPICAL
Status: DISCONTINUED | OUTPATIENT
Start: 2024-06-13 | End: 2024-06-16 | Stop reason: HOSPADM

## 2024-06-13 RX ORDER — LIDOCAINE 40 MG/G
CREAM TOPICAL
Status: DISCONTINUED | OUTPATIENT
Start: 2024-06-13 | End: 2024-06-16 | Stop reason: HOSPADM

## 2024-06-13 RX ORDER — LOPERAMIDE HCL 2 MG
2 CAPSULE ORAL
Status: CANCELLED | OUTPATIENT
Start: 2024-06-13

## 2024-06-13 RX ORDER — FENTANYL CITRATE 50 UG/ML
INJECTION, SOLUTION INTRAMUSCULAR; INTRAVENOUS PRN
Status: DISCONTINUED | OUTPATIENT
Start: 2024-06-13 | End: 2024-06-13

## 2024-06-13 RX ORDER — METOCLOPRAMIDE HYDROCHLORIDE 5 MG/ML
10 INJECTION INTRAMUSCULAR; INTRAVENOUS EVERY 6 HOURS PRN
Status: DISCONTINUED | OUTPATIENT
Start: 2024-06-13 | End: 2024-06-16 | Stop reason: HOSPADM

## 2024-06-13 RX ORDER — LOPERAMIDE HCL 2 MG
4 CAPSULE ORAL
Status: DISCONTINUED | OUTPATIENT
Start: 2024-06-13 | End: 2024-06-16 | Stop reason: HOSPADM

## 2024-06-13 RX ORDER — AMOXICILLIN 250 MG
2 CAPSULE ORAL 2 TIMES DAILY
Status: DISCONTINUED | OUTPATIENT
Start: 2024-06-13 | End: 2024-06-16 | Stop reason: HOSPADM

## 2024-06-13 RX ORDER — ONDANSETRON 4 MG/1
4 TABLET, ORALLY DISINTEGRATING ORAL EVERY 6 HOURS PRN
Status: DISCONTINUED | OUTPATIENT
Start: 2024-06-13 | End: 2024-06-16 | Stop reason: HOSPADM

## 2024-06-13 RX ORDER — METOCLOPRAMIDE 10 MG/1
10 TABLET ORAL EVERY 6 HOURS PRN
Status: DISCONTINUED | OUTPATIENT
Start: 2024-06-13 | End: 2024-06-16 | Stop reason: HOSPADM

## 2024-06-13 RX ORDER — CEFAZOLIN SODIUM/WATER 2 G/20 ML
2 SYRINGE (ML) INTRAVENOUS SEE ADMIN INSTRUCTIONS
Status: CANCELLED | OUTPATIENT
Start: 2024-06-13

## 2024-06-13 RX ORDER — OXYTOCIN/0.9 % SODIUM CHLORIDE 30/500 ML
PLASTIC BAG, INJECTION (ML) INTRAVENOUS CONTINUOUS PRN
Status: DISCONTINUED | OUTPATIENT
Start: 2024-06-13 | End: 2024-06-13

## 2024-06-13 RX ORDER — CITRIC ACID/SODIUM CITRATE 334-500MG
30 SOLUTION, ORAL ORAL
Status: CANCELLED | OUTPATIENT
Start: 2024-06-13

## 2024-06-13 RX ORDER — LOPERAMIDE HCL 2 MG
4 CAPSULE ORAL
Status: CANCELLED | OUTPATIENT
Start: 2024-06-13

## 2024-06-13 RX ORDER — METHYLERGONOVINE MALEATE 0.2 MG/ML
200 INJECTION INTRAVENOUS
Status: CANCELLED | OUTPATIENT
Start: 2024-06-13

## 2024-06-13 RX ORDER — SODIUM CHLORIDE, SODIUM LACTATE, POTASSIUM CHLORIDE, CALCIUM CHLORIDE 600; 310; 30; 20 MG/100ML; MG/100ML; MG/100ML; MG/100ML
INJECTION, SOLUTION INTRAVENOUS CONTINUOUS PRN
Status: DISCONTINUED | OUTPATIENT
Start: 2024-06-13 | End: 2024-06-13 | Stop reason: HOSPADM

## 2024-06-13 RX ORDER — BUPIVACAINE HYDROCHLORIDE 2.5 MG/ML
INJECTION, SOLUTION EPIDURAL; INFILTRATION; INTRACAUDAL
Status: DISCONTINUED | OUTPATIENT
Start: 2024-06-13 | End: 2024-06-13

## 2024-06-13 RX ORDER — ONDANSETRON 2 MG/ML
INJECTION INTRAMUSCULAR; INTRAVENOUS PRN
Status: DISCONTINUED | OUTPATIENT
Start: 2024-06-13 | End: 2024-06-13

## 2024-06-13 RX ORDER — ERYTHROMYCIN 5 MG/G
OINTMENT OPHTHALMIC
Status: DISCONTINUED
Start: 2024-06-13 | End: 2024-06-13 | Stop reason: HOSPADM

## 2024-06-13 RX ORDER — ACETAMINOPHEN 325 MG/1
975 TABLET ORAL EVERY 6 HOURS
Status: DISCONTINUED | OUTPATIENT
Start: 2024-06-13 | End: 2024-06-16 | Stop reason: HOSPADM

## 2024-06-13 RX ORDER — SODIUM CHLORIDE, SODIUM LACTATE, POTASSIUM CHLORIDE, CALCIUM CHLORIDE 600; 310; 30; 20 MG/100ML; MG/100ML; MG/100ML; MG/100ML
INJECTION, SOLUTION INTRAVENOUS CONTINUOUS
Status: CANCELLED | OUTPATIENT
Start: 2024-06-13

## 2024-06-13 RX ORDER — MISOPROSTOL 200 UG/1
800 TABLET ORAL
Status: CANCELLED | OUTPATIENT
Start: 2024-06-13

## 2024-06-13 RX ORDER — ACETAMINOPHEN 325 MG/1
975 TABLET ORAL ONCE
Status: CANCELLED | OUTPATIENT
Start: 2024-06-13 | End: 2024-06-13

## 2024-06-13 RX ORDER — BISACODYL 10 MG
10 SUPPOSITORY, RECTAL RECTAL DAILY PRN
Status: DISCONTINUED | OUTPATIENT
Start: 2024-06-15 | End: 2024-06-16 | Stop reason: HOSPADM

## 2024-06-13 RX ORDER — OXYTOCIN/0.9 % SODIUM CHLORIDE 30/500 ML
1-24 PLASTIC BAG, INJECTION (ML) INTRAVENOUS CONTINUOUS
Status: DISCONTINUED | OUTPATIENT
Start: 2024-06-13 | End: 2024-06-13 | Stop reason: HOSPADM

## 2024-06-13 RX ORDER — TRANEXAMIC ACID 10 MG/ML
1 INJECTION, SOLUTION INTRAVENOUS EVERY 30 MIN PRN
Status: DISCONTINUED | OUTPATIENT
Start: 2024-06-13 | End: 2024-06-16 | Stop reason: HOSPADM

## 2024-06-13 RX ADMIN — ONDANSETRON 4 MG: 2 INJECTION INTRAMUSCULAR; INTRAVENOUS at 13:47

## 2024-06-13 RX ADMIN — KETOROLAC TROMETHAMINE 30 MG: 30 INJECTION, SOLUTION INTRAMUSCULAR at 17:27

## 2024-06-13 RX ADMIN — Medication 10 ML: at 11:05

## 2024-06-13 RX ADMIN — PHENYLEPHRINE HYDROCHLORIDE 50 MCG: 10 INJECTION INTRAVENOUS at 14:23

## 2024-06-13 RX ADMIN — TRANEXAMIC ACID 1 G: 1 INJECTION, SOLUTION INTRAVENOUS at 14:18

## 2024-06-13 RX ADMIN — SODIUM CITRATE AND CITRIC ACID MONOHYDRATE 30 ML: 500; 334 SOLUTION ORAL at 13:31

## 2024-06-13 RX ADMIN — PROPRANOLOL HYDROCHLORIDE 10 MG: 10 TABLET ORAL at 08:48

## 2024-06-13 RX ADMIN — KETOROLAC TROMETHAMINE 30 MG: 30 INJECTION, SOLUTION INTRAMUSCULAR at 23:21

## 2024-06-13 RX ADMIN — DEXMEDETOMIDINE HYDROCHLORIDE 12 MCG: 100 INJECTION, SOLUTION INTRAVENOUS at 14:31

## 2024-06-13 RX ADMIN — DIPHENHYDRAMINE HYDROCHLORIDE 25 MG: 25 CAPSULE ORAL at 20:06

## 2024-06-13 RX ADMIN — SODIUM CHLORIDE, POTASSIUM CHLORIDE, SODIUM LACTATE AND CALCIUM CHLORIDE: 600; 310; 30; 20 INJECTION, SOLUTION INTRAVENOUS at 04:29

## 2024-06-13 RX ADMIN — SODIUM CHLORIDE: 9 INJECTION, SOLUTION INTRAVENOUS at 12:42

## 2024-06-13 RX ADMIN — SODIUM CHLORIDE, POTASSIUM CHLORIDE, SODIUM LACTATE AND CALCIUM CHLORIDE: 600; 310; 30; 20 INJECTION, SOLUTION INTRAVENOUS at 12:41

## 2024-06-13 RX ADMIN — SERTRALINE HYDROCHLORIDE 100 MG: 100 TABLET ORAL at 09:03

## 2024-06-13 RX ADMIN — LIDOCAINE HYDROCHLORIDE,EPINEPHRINE BITARTRATE 5 ML: 20; .005 INJECTION, SOLUTION EPIDURAL; INFILTRATION; INTRACAUDAL; PERINEURAL at 13:38

## 2024-06-13 RX ADMIN — DOCUSATE SODIUM AND SENNOSIDES 1 TABLET: 8.6; 5 TABLET, FILM COATED ORAL at 20:05

## 2024-06-13 RX ADMIN — CALCIUM CARBONATE (ANTACID) CHEW TAB 500 MG 500 MG: 500 CHEW TAB at 09:48

## 2024-06-13 RX ADMIN — Medication: at 11:58

## 2024-06-13 RX ADMIN — SODIUM CHLORIDE 250 ML: 9 INJECTION, SOLUTION INTRAVENOUS at 12:21

## 2024-06-13 RX ADMIN — FAMOTIDINE 20 MG: 10 INJECTION, SOLUTION INTRAVENOUS at 13:29

## 2024-06-13 RX ADMIN — Medication 2 MILLI-UNITS/MIN: at 08:45

## 2024-06-13 RX ADMIN — DEXAMETHASONE SODIUM PHOSPHATE 8 MG: 4 INJECTION, SOLUTION INTRA-ARTICULAR; INTRALESIONAL; INTRAMUSCULAR; INTRAVENOUS; SOFT TISSUE at 14:17

## 2024-06-13 RX ADMIN — Medication 2 G: at 13:50

## 2024-06-13 RX ADMIN — SODIUM CHLORIDE, SODIUM LACTATE, POTASSIUM CHLORIDE, CALCIUM CHLORIDE AND DEXTROSE MONOHYDRATE: 5; 600; 310; 30; 20 INJECTION, SOLUTION INTRAVENOUS at 21:54

## 2024-06-13 RX ADMIN — PHENYLEPHRINE HYDROCHLORIDE 100 MCG: 10 INJECTION INTRAVENOUS at 14:31

## 2024-06-13 RX ADMIN — BUPIVACAINE 10 ML: 13.3 INJECTION, SUSPENSION, LIPOSOMAL INFILTRATION at 15:10

## 2024-06-13 RX ADMIN — MORPHINE SULFATE 3 MG: 1 INJECTION EPIDURAL; INTRATHECAL; INTRAVENOUS at 14:11

## 2024-06-13 RX ADMIN — METOCLOPRAMIDE HYDROCHLORIDE 10 MG: 5 INJECTION INTRAMUSCULAR; INTRAVENOUS at 13:27

## 2024-06-13 RX ADMIN — FENTANYL CITRATE 100 MCG: 50 INJECTION INTRAMUSCULAR; INTRAVENOUS at 13:35

## 2024-06-13 RX ADMIN — Medication 100 ML/HR: at 16:30

## 2024-06-13 RX ADMIN — BUPIVACAINE HYDROCHLORIDE 15 ML: 2.5 INJECTION, SOLUTION EPIDURAL; INFILTRATION; INTRACAUDAL at 15:10

## 2024-06-13 RX ADMIN — DEXTROSE, SODIUM CHLORIDE, SODIUM LACTATE, POTASSIUM CHLORIDE, AND CALCIUM CHLORIDE: 5; .6; .31; .03; .02 INJECTION, SOLUTION INTRAVENOUS at 21:54

## 2024-06-13 RX ADMIN — LIDOCAINE HYDROCHLORIDE,EPINEPHRINE BITARTRATE 3 ML: 20; .005 INJECTION, SOLUTION EPIDURAL; INFILTRATION; INTRACAUDAL; PERINEURAL at 14:11

## 2024-06-13 RX ADMIN — Medication 600 ML/HR: at 14:09

## 2024-06-13 RX ADMIN — ACETAMINOPHEN 975 MG: 325 TABLET, FILM COATED ORAL at 20:05

## 2024-06-13 RX ADMIN — SODIUM CHLORIDE, POTASSIUM CHLORIDE, SODIUM LACTATE AND CALCIUM CHLORIDE: 600; 310; 30; 20 INJECTION, SOLUTION INTRAVENOUS at 14:11

## 2024-06-13 RX ADMIN — ACETAMINOPHEN 650 MG: 325 TABLET, FILM COATED ORAL at 13:31

## 2024-06-13 RX ADMIN — LIDOCAINE HYDROCHLORIDE,EPINEPHRINE BITARTRATE 5 ML: 20; .005 INJECTION, SOLUTION EPIDURAL; INFILTRATION; INTRACAUDAL; PERINEURAL at 13:30

## 2024-06-13 ASSESSMENT — ACTIVITIES OF DAILY LIVING (ADL)
ADLS_ACUITY_SCORE: 20
ADLS_ACUITY_SCORE: 24
ADLS_ACUITY_SCORE: 20
ADLS_ACUITY_SCORE: 24
ADLS_ACUITY_SCORE: 24
ADLS_ACUITY_SCORE: 20
ADLS_ACUITY_SCORE: 24
ADLS_ACUITY_SCORE: 20

## 2024-06-13 NOTE — PLAN OF CARE
[facility] Labor and Delivery Progress Note    Christi Barrientos MRN# [id number]   Age: 28 year old YOB: 1995           Subjective:   Patient VSS. Epidural. SROM with [facility] at 0330. Internal monitors placed.            Objective:   Patient Vitals for the past 24 hrs:   BP Temp Temp src Resp SpO2 Height Weight BMI (Calculated) Oximeter Heart Rate   06/13/24 0619 100/55 98.4  F (36.9  C) Oral 14 -- -- -- -- 69 bpm   06/13/24 0518 128/74 98.4  F (36.9  C) Oral 14 -- -- -- -- 80 bpm   06/13/24 0419 123/74 98  F (36.7  C) Oral -- -- -- -- -- 62 bpm   06/13/24 0318 117/65 98.5  F (36.9  C) Oral 15 -- -- -- -- 69 bpm   06/13/24 0218 106/56 -- -- -- -- -- -- -- 77 bpm   06/13/24 0118 100/55 -- -- -- -- -- -- -- 74 bpm   06/13/24 0018 116/63 -- -- -- -- -- -- -- 68 bpm   06/12/24 2318 110/60 -- -- -- -- -- -- -- 71 bpm   06/12/24 2219 100/53 -- -- -- -- -- -- -- 71 bpm   06/12/24 2119 117/59 -- -- -- -- -- -- -- 65 bpm   06/12/24 2046 100/59 -- -- -- -- -- -- -- 78 bpm   06/12/24 2014 107/56 -- -- -- -- -- -- -- 67 bpm   06/12/24 2010 101/58 -- -- -- -- -- -- -- 67 bpm   06/12/24 2004 102/58 -- -- -- -- -- -- -- 70 bpm   06/12/24 1959 108/60 -- -- -- -- -- -- -- 71 bpm   06/12/24 1954 106/58 -- -- -- -- -- -- -- 65 bpm   06/12/24 1950 108/58 -- -- -- -- -- -- -- 76 bpm   06/12/24 1945 106/60 -- -- -- -- -- -- -- 70 bpm   06/12/24 1939 112/63 -- -- -- -- -- -- -- 66 bpm   06/12/24 1935 111/62 -- -- -- -- -- -- -- 66 bpm   06/12/24 1929 113/59 -- -- -- -- -- -- -- 65 bpm   06/12/24 1927 117/69 -- -- -- -- -- -- -- 75 bpm   06/12/24 1925 111/66 98.1  F (36.7  C) Oral 15 -- -- -- -- 67 bpm   06/12/24 1923 105/61 -- -- -- -- -- -- -- 77 bpm   06/12/24 1921 129/85 -- -- -- 97 % -- -- -- 77 bpm   06/12/24 1919 (!) 143/65 -- -- 16 -- -- -- -- 75 bpm   06/12/24 1916 -- -- -- -- 97 % -- -- -- 75 bpm   06/12/24 1656 (!) 141/80 -- -- 16 -- -- -- -- 67 bpm   06/12/24 1443 116/76 -- -- -- -- -- -- -- 67 bpm  "  24 1428 120/74 -- -- -- -- -- -- -- 68 bpm   24 1413 118/77 -- -- 16 -- -- -- -- 71 bpm   24 1402 -- -- -- -- -- 1.702 m (5' 7\") 117 kg (258 lb) 40.41 --   24 1357 124/68 -- -- 16 -- -- -- -- 71 bpm         Cervical Exam: 5.5 / 70% / -2      Position: Mid    Membranes: Intact     Fetal Heart Rate:    See Flowsheet          Assessment:   Christi Barrientos is a 28 year old  who is 39w3d here with elective IOL.          Plan:   Anticipate      Report given to Herson BOLANOS.      Elva Diamond RN      "

## 2024-06-13 NOTE — PROGRESS NOTES
"S:  Chasity is feeling tired, starting to feel frustrated with many position changes due to on and off Cat II FHT. Throughout the morning, discussed Cat II FHT with patient including different corrective measures and possibility of needing CS birth if FHT is not reassuring.     O:  /71   Temp 98.4  F (36.9  C) (Oral)   Resp 18   Ht 1.702 m (5' 7\")   Wt 117 kg (258 lb)   LMP 2023   SpO2 94%   BMI 40.41 kg/m    FHT: baseline: 130; variability: moderate; accels: yes; decels: intermittent late decels; ctx: q 2-4 min  Pitocin off  -180 over last 45 minutes    Labor Course:  24  1445 triage assessment for latent labor and decreased fetal movement, pt requested to stay for IOL for BMI 38 in setting of decreased fetal movement, SVE 1.5/70/-2 intact  1715 Cook catheter placed  2300 Cook catheter out     24  0330 SROM with mec stained fluid, SVE 5.5/70/-2, FSE and IUPC placed due to difficulty monitoring  0822 SVE 6/75/-2  0845 Pitocin start  0905 FHT Cat II with recurrent late decels x 45 min  0950 Pitocin stopped, FHT recover to Cat I  1040, pt feeling rectal pressure, SVE 6/90/-2, Pitocin restarted  1140 late decels, Pitocin stopped, patient repositioned  1222 amnioinfusion started    A:   at 39w3d, here for IOL for BMI 38 in setting of decreased fetal movement  Labor status: active labor with inadequate contractions via MVU  GBS negative  Fetus Cat II with moderate variability and accels    P:  Continue corrective measures for FHT  As decels are now intermittent, plan to observe per Ismael algorithm  Consider starting Pitocin as able when Cat I FHT    AIDEN Arndt CNM      "

## 2024-06-13 NOTE — PROGRESS NOTES
"S: Chasity is doing well. Noted lots of fluid leaking about 3:15 am. SROM with thick meconium fluid. Discussed NICU team will be present at delivery. Okay with internal monitoring now. Both IUPC and FSE placed. Fetal heart tracing continues to be primarily category 1, baseline 130, moderate variability, positive accelerations, with one random variable deceleration.     O:  Blood pressure 102/58, temperature 98.1  F (36.7  C), temperature source Oral, resp. rate 15, height 1.702 m (5' 7\"), weight 117 kg (258 lb), last menstrual period 2023, SpO2 97%.  General appearance: comfortable with epidural    CONTACTIONS: Contractions every 2-6 minutes.  Palpate: moderate  FETAL HEART TONES: baseline 130 with moderate FHR variability and    accelerations yes. Decelerations yes, no variable deceleration.    NST: REACTIVE  ROM: thick meconium fluid  PELVIC EXAM: PELVIC EXAM: 5.5/ 70/ Mid/ soft/ -2   Fetal Position: cephalic   Bloody show: No  Pitocin- none,  Antibiotics- none  Cervical ripening: N/A    ASSESSMENT:  ==============  IUP @ 39w3d IOL BMI 48, early labor   Fetal Heart rate tracing category one  GBS- negative     PLAN:  ===========  Comfort measures prn   Pain medication with epidural   Anticipate   Labor induction with Pitocin if needed   Reevaluate in 2-4 hours/PRN     AIDEN Braun CNM    "

## 2024-06-13 NOTE — PROGRESS NOTES
Delayed entry due to patient cares:    I received a call from Isabel Pierce CNM about persistent cat 2 tracing unresponsive to resuscitative efforts for 60min and need to move forward with .  I was caring for another patient and Dr. Duong, PGY3 called to review tracing and discuss surgery with patient while I completed my other duties.  When I arrived the anesthesia team was ready and had discussed with patient.  I reviewed the labor course and FHT with patient and answered questions regarding the .  She had already signed consents and felt comfortable proceeding.  To OR    FAREED ROSARIO MD

## 2024-06-13 NOTE — PLAN OF CARE
Goal Outcome Evaluation:  OR to PACU Transfer Note  Data: Pt to OB PACU at 1517 via cart. PIV infusing NS with pitocin. Pt without complications, perales with clear urine urine to gravity, vitals WNL, pt does not complain of pain and/or nausea.   Interventions: IV to pump, monitors and alarms on, warm blankets, SCD on.  Response: stable.  at bedside  Plan: Patient instructed to notify RN for pain or nausea, routine post op cares.

## 2024-06-13 NOTE — PROVIDER NOTIFICATION
06/13/24 1811   Provider Notification   Provider Name/Title G2 Gwan   Method of Notification Electronic Page   Request Evaluate-Remote   Notification Reason Vital Signs Change  (FYI- pt BPs elevated at 133/94 and 138/90, asymptomatic.)

## 2024-06-13 NOTE — DISCHARGE SUMMARY
[facility] Discharge Summary    Christi Barrientos MRN# [id number]   Age: 28 year old YOB: 1995     Date of Admission:  2024  Date of Discharge::  2024  Admitting Physician:  AIDEN Braun CN  Discharge Physician:  Julia Downey MD             Admission Diagnoses:   Intrauterine pregnancy at 39w3d  Obesity, BMI 38          Discharge Diagnosis:     Same, delivered   Category II tracing remote from delivery   Gestational Hypertension          Procedures:     Procedure(s): Primary low transverse  section with  layer closure via Pfannenstiel single skin incision                Medications Prior to Admission:     Medications Prior to Admission   Medication Sig Dispense Refill Last Dose    Ascorbic Acid (VITAMIN C) 500 MG CAPS    2024    FERROUS SULFATE PO Take 60 mg by mouth daily   2024    fluticasone (FLONASE) 50 MCG/ACT nasal spray Spray 1 spray into both nostrils daily   2024    hydrOXYzine (ATARAX) 25 MG tablet    More than a month    loratadine (CLARITIN) 10 MG tablet Take 10 mg by mouth daily   2024    Prenatal Vit-DSS-Fe Cbn-FA (PRENATAL AD PO)    2024    propranolol (INDERAL) 10 MG tablet Take 10 mg by mouth daily   2024    sertraline (ZOLOFT) 50 MG tablet    2024    Vitamin D3 (CHOLECALCIFEROL) 25 mcg (1000 units) tablet Take by mouth daily   2024    Misc. Devices (BREAST PUMP) MISC 1 each as needed (for expressing breast milk) 1 each 0              Discharge Medications:        Review of your medicines        UNREVIEWED medicines. Ask your doctor about these medicines        Dose / Directions   FERROUS SULFATE PO      Dose: 60 mg  Take 60 mg by mouth daily  Refills: 0     fluticasone 50 MCG/ACT nasal spray  Commonly known as: FLONASE      Dose: 1 spray  Spray 1 spray into both nostrils daily  Refills: 0     hydrOXYzine HCl 25 MG tablet  Commonly known as: ATARAX      Refills: 0     loratadine 10 MG  tablet  Commonly known as: CLARITIN      Dose: 10 mg  Take 10 mg by mouth daily  Refills: 0     PRENATAL AD PO      Refills: 0     propranolol 10 MG tablet  Commonly known as: INDERAL  Indication: Feeling Anxious      Dose: 10 mg  Take 10 mg by mouth daily  Refills: 0     sertraline 50 MG tablet  Commonly known as: ZOLOFT      Refills: 0     Vitamin C 500 MG Caps      Refills: 0     Vitamin D3 25 mcg (1000 units) tablet  Commonly known as: CHOLECALCIFEROL      Take by mouth daily  Refills: 0            START taking        Dose / Directions   norethindrone 0.35 MG tablet  Commonly known as: MICRONOR  Used for: Encounter for other general counseling or advice on contraception      Dose: 0.35 mg  Take 1 tablet (0.35 mg) by mouth daily  Quantity: 90 tablet  Refills: 3            CONTINUE these medicines which have NOT CHANGED        Dose / Directions   breast pump Misc  Used for: Breast feeding status of mother      Dose: 1 each  1 each as needed (for expressing breast milk)  Quantity: 1 each  Refills: 0               Where to get your medicines        These medications were sent to [facility] [address] [address]  [address] [address]      Phone: [phone]   norethindrone 0.35 MG tablet                Consultations:   Lactation   Anesthesiology          Brief Admission History:   Ms. Christi Barrientos is a 28 year old now  who initially presented at 39w3d for primary  due to category II tracing remote from delivery        Intraoperative course   The procedure was uncomplicated.  EBL 1494 mL.  See operative report for details.        Postpartum Course   The patient was diagnosed with gestational hypertension in the postpartum period. Her labs were within normal limits, she remained asymptomatic and her subsequent blood pressures were normotensive.    She recovered as anticipated and experienced no post-operative complications. On discharge, her  pain was well controlled. Vaginal bleeding is similar to peak menstrual flow.  Voiding without difficulty.  Ambulating well and tolerating a normal diet.  No fever or significant wound drainage.  Breastfeeding well.  Infant is stable.  No bowel movement yet.  She was discharged on post-partum day #3.    Post-partum hemoglobin:   Hemoglobin   Date Value Ref Range Status   06/14/2024 7.8 (L) 11.7 - 15.7 g/dL Final   06/13/2015 12.9 11.7 - 15.7 g/dL Final             Discharge Instructions and Follow-Up:     Discharge diet: Regular   Discharge activity: No lifting greater than 20 lbs, pushing, pulling, or other strenuous activity for 6 weeks. Pelvic rest for 6 weeks including no sexual intercourse, tampons, or douching. No driving until you can slam on the breaks without pain or while on narcotic pain medications.    Discharge follow-up: Follow up 2-3 days blood pressure check  Follow up 2 weeks incisional check   Follow up with primary OB for routine postpartum visit in 6 weeks  Plan to monitor blood pressures through [facility] in the outpatient setting    Wound care: Keep incision clean and dry           Discharge Disposition:     Discharged to home     Parisa Duong MD, MPH, MS  Obstetrics, Gynecology & Women's Health   Resident, PGY-3  06/16/2024 9:20 AM

## 2024-06-13 NOTE — PROGRESS NOTES
"S: Chasity is doing well. Discussed checking her cervix and considering AROM for internal monitors and potential pitocin use at that time. She is hesitant to break her water, wants just a little more time. Okay with cervical check first though to fully decide. Discussed risks and benefits of AROM including risk of infection and potentially changes in fetal heart tracing. Discussed benefits of internal monitoring as still difficult to get contractions documented. Fetal heart is documenting well with Winnie. Discussed progressing in labor and need for internal monitors if we were to use pitocin. Cervix 5.5/70/-2 intact. Due to some progress she prefers to give it two more hours and then consider AROM after another cervical check. No other questions or concerns at this time.     O:  Blood pressure 102/58, temperature 98.1  F (36.7  C), temperature source Oral, resp. rate 15, height 1.702 m (5' 7\"), weight 117 kg (258 lb), last menstrual period 2023, SpO2 97%.  General appearance: comfortable with epidural     CONTACTIONS: Contractions every 2-5 minutes.  Palpate: moderate  FETAL HEART TONES: baseline 135 with moderate FHR variability and    accelerations yes. Decelerations yes, random variable.    NST: REACTIVE  ROM: not ruptured  PELVIC EXAM: PELVIC EXAM: 5.5/ 70/ Mid/ soft/ -2  Fetal Position: cephalic   Bloody show: Yes   Pitocin- none,  Antibiotics- none  Cervical ripening: N/A    ASSESSMENT:  ==============  IUP @ 39w3d IOL BMI 38, early labor with normal progress  Fetal Heart rate tracing category one  GBS- negative     PLAN:  ===========  Comfort measures prn   Pain medication with epidural  Anticipate   Labor induction with Pitocin and AROM, patient declined at this time, wants to reconsider in 2 hours  Reevaluate in 2-4 hours/PRN     AIDEN Braun CNM    "

## 2024-06-13 NOTE — ANESTHESIA CARE TRANSFER NOTE
Patient: Christi Barrientos    Procedure: Procedure(s):   section       Diagnosis:  delivery w/o mention of indication, yudith villafuerte [O82]  Diagnosis Additional Information: No value filed.    Anesthesia Type:   Epidural     Note:    Oropharynx: spontaneously breathing and oropharynx clear of all foreign objects  Level of Consciousness: awake      Independent Airway: airway patency satisfactory and stable  Dentition: dentition unchanged  Vital Signs Stable: post-procedure vital signs reviewed and stable  Report to RN Given: handoff report given  Patient transferred to: Labor and Delivery    Handoff Report: Identifed the Patient, Identified the Reponsible Provider, Reviewed the pertinent medical history, Discussed the surgical course, Reviewed Intra-OP anesthesia mangement and issues during anesthesia, Set expectations for post-procedure period and Allowed opportunity for questions and acknowledgement of understanding      Vitals:  Vitals Value Taken Time   /83 24 1518   Temp     Pulse 69 24 1520   Resp 3 24 1520   SpO2 96 % 24 1520   Vitals shown include unfiled device data.    Electronically Signed By: Heath Li MD  2024  3:21 PM

## 2024-06-13 NOTE — PROGRESS NOTES
Brief Consult Note    Paged by [facility] CNM service regarding patient's laboring course. Patient noted to have persistent Category II tracing despite several attempts at intrauterine rescucitative measures throughout the day. Has remained 6 cm since 10 AM. At this time tracing remains Category II with persistent late decelerations. Discussed recommendations for proceeding with  delivery at this time. Discussed risks and benefits of procedure, including but not limited to bleeding, infection, injury to surrounding organs (vagina, cervix, uterus, ovaries, fallopian tubes, bowel, bladder, ureters, blood vessels and nerves of the pelvis), injury to infant, and the potential need for another surgery should an intraoperative injury go unrecognized or if patient were to have continued bleeding. Patient had time to ask questions and expressed understanding of procedure and associated risks. Agreed to blood transfusion if necessary. Consent form signed. All questions answered. Will proceed to OR at this time     Parisa Duong MD, MPH, MS  Obstetrics, Gynecology & Women's Health   Resident, PGY-3  2024 1:30 PM

## 2024-06-13 NOTE — PROGRESS NOTES
Decision has been made collaboratively with patient for caesarean section for persistent category II tracing. She is being prepped for surgery. Report given and care transferred to CICI Mcfadden at 1320.

## 2024-06-13 NOTE — ANESTHESIA PROCEDURE NOTES
"Epidural catheter Procedure Note    Pre-Procedure   Staff -        Anesthesiologist:  Olayinka Linder MD       Resident/Fellow: Tom Stanford MD       Performed By: resident       Location: OB       Pre-Anesthestic Checklist: patient identified, IV checked, risks and benefits discussed, informed consent, monitors and equipment checked, pre-op evaluation, at physician/surgeon's request and post-op pain management  Timeout:       Correct Patient: Yes        Correct Procedure: Yes        Correct Site: Yes        Correct Position: Yes   Procedure Documentation  Procedure: epidural catheter       Diagnosis: analgesia       Patient Position: sitting       Skin prep: Chloraprep       Local skin infiltrated with 3 mL of 1% lidocaine.        Insertion Site: L3-4. (midline approach).       Technique: LORT saline        OLIVIA at 6.5 cm.       Needle Type: [employer] needle       Needle Gauge: 17.        Needle Length (Inches): 3.5        Catheter: 19 G.          Catheter threaded easily.         5 cm epidural space.         Threaded 11.5 cm at skin.         # of attempts: 1 and  # of redirects:  0    Assessment/Narrative         Paresthesias: No.       Test dose of 3 mL lidocaine 1.5% w/ 1:200,000 epinephrine at 17:17 CDT.         Test dose negative, 3 minutes after injection, for signs of intravascular, subdural, or intrathecal injection.       Insertion/Infusion Method: LORT saline       No aspiration negative for Heme or CSF via Epidural Catheter.      FOR [facility] ([facility]) ONLY:   Pain Team Contact information: please page the Pain Team Via [employer]. Search \"Pain\". During daytime hours, please page the attending first. At night please page the resident first.      "

## 2024-06-13 NOTE — PROGRESS NOTES
Provider notification:   Provider: Isabel CARRILLOM was notified at 1145 regarding: a persistent Category II fetal heart rate tracing for 30 minutes.    Category II Algorithm     Fetal heart rate and uterine activity reviewed with provider.    EFM interpretation suggests: absence of concern for metabolic acidemia due to: moderate variability. EFM suggests concern for interruption of the oxygen pathway due to:  late decelerations..     Interventions to improve fetal oxygenation for a Category II tracing include: blood pressure check, emotional support, IV fluid bolus , maternal positioning, and oxytocin discontinued    After discussion with provider:Provider coming to bedside    Plan per provider / orders received for amniofusion, maternal reposition, continue monitoring and reevaluate in 40 minutes.

## 2024-06-13 NOTE — PROGRESS NOTES
Category II Fetal Tracing   Category II Algorithm    S:   I was at bedside with labor RN throughout persistent Category II fetal heart rate tracing for 45 minutes.    Strip reviewed at bedside.     O:   Vitals:   Vitals:    06/13/24 0800 06/13/24 0819 06/13/24 0830 06/13/24 0900   BP:  133/83  114/62   BP Location:  Left arm  Left arm   Patient Position:  Semi-Al's  Right side   Cuff Size:  Adult Large  Adult Large   Resp:  16  16   Temp:  98.2  F (36.8  C)  98.6  F (37  C)   TempSrc:  Oral  Oral   SpO2: 93% 98% 95% 97%   Weight:       Height:         Fetal Baseline Rate: 135, normal  Variability: minimal x 15 min followed by moderate x 20 min  Accelerations: present   Decelerations: present -  late.       Deceleration frequency: recurrent (occurring 50% or more)       Are the decelerations significant?   Yes.  Uterine Activity: every 2 minutes    Interventions to improve fetal oxygenation for a Category II tracing include : IV fluid bolus , maternal positioning, and oxytocin discontinued    A:   Persistent Category II tracing.     P:   Per the Category II algorithm, the plan is to continue to observe and apply intrauterine resuscitation measures as indicated.  Reevaluate in 30 min.    AIDEN Arndt CNM

## 2024-06-13 NOTE — PROGRESS NOTES
Provider notification:   Provider: Isabel Pierce CNM was at bedside regarding: a persistent Category II fetal heart rate tracing for 45 minutes.    Category II Algorithm     Fetal heart rate and uterine activity reviewed with provider.    EFM interpretation suggests: absence of concern for metabolic acidemia due to: presence of accelerations and moderate variability. EFM suggests concern for interruption of the oxygen pathway due to:  late decelerations..     Interventions to improve fetal oxygenation for a Category II tracing include: IV fluid bolus , maternal positioning, and oxytocin discontinued    After discussion with provider: Plan team reassessment in 30 minutes.    Plan per provider / orders received for changing positions and continue monitoring.

## 2024-06-13 NOTE — PROGRESS NOTES
"S: Chasity is doing well. Comfortable with epidural in place. Checked on cooks balloon, it was sitting her vagina so removed the cook catheter. Cervix 70/-2 intact. She continues to contract well on her own so we discussed allowing her body some time to continue to make change. Discussed next steps of pitocin and AROM to place IUPC and FSE as it is difficult to monitor contractions and baby.     O:  Blood pressure 102/58, temperature 98.1  F (36.7  C), temperature source Oral, resp. rate 15, height 1.702 m (5' 7\"), weight 117 kg (258 lb), last menstrual period 2023, SpO2 97%.  General appearance: comfortable    CONTACTIONS: Contractions every 1-10 minutes.  Palpate: moderate  FETAL HEART TONES: baseline 135 with moderate FHR variability and    accelerations yes. Decelerations no.    NST: REACTIVE  ROM: not ruptured  PELVIC EXAM: PELVIC EXAM: / Mid/ soft/ -2   Fetal Position: cephalic   Bloody show: Yes   Pitocin- none,  Antibiotics- none  Cervical ripening: cervical perales bulb removed    ASSESSMENT:  ==============  IUP @ 39w2d IOL BMI 38, early labor    Fetal Heart rate tracing category one  GBS- negative     PLAN:  ===========  Comfort measures prn   Pain medication with epidural   Anticipate   Labor induction with pitocin and AROM as needed.   Reevaluate in 2-4 hours/PRN     AIDEN Braun CNM    "

## 2024-06-13 NOTE — ANESTHESIA PROCEDURE NOTES
"TAP Procedure Note    Pre-Procedure   Staff -        Anesthesiologist:  Meka Cummings MD       Resident/Fellow: Heath Li MD       Performed By: resident and with residents       Procedure performed by resident/fellow/CRNA in presence of a teaching physician.         Location: OB       Pre-Anesthestic Checklist: patient identified, IV checked, site marked, risks and benefits discussed, informed consent, monitors and equipment checked and post-op pain management  Timeout:       Correct Patient: Yes        Correct Procedure: Yes        Correct Site: Yes        Correct Position: Yes        Correct Laterality: Yes        Site Marked: Yes  Procedure Documentation  Procedure: TAP       Diagnosis: PAIN CONTROL       Laterality: bilateral       Patient Position: supine       Skin prep: Chloraprep       Needle Type: short bevel       Needle Gauge: 21.        Needle Length (millimeters): 110        Ultrasound guided       1. Ultrasound was used to identify targeted nerve, plexus, vascular marker, or fascial plane and place a needle adjacent to it in real-time.       2. Ultrasound was used to visualize the spread of anesthetic in close proximity to the above referenced structure.       3. A permanent image is entered into the patient's record.    Assessment/Narrative         The placement was negative for: blood aspirated and painful injection       Paresthesias: No.       Bolus given via needle..        Secured via.        Complications: none    Medication(s) Administered   Bupivacaine 0.25% PF (Infiltration) - Infiltration   15 mL - 6/13/2024 3:10:00 PM  Bupivacaine liposome (Exparel) 1.3% LA inj susp (Infiltration) - Infiltration   10 mL - 6/13/2024 3:10:00 PM    FOR [facility] ([facility]) ONLY:   Pain Team Contact information: please page the Pain Team Via [employer]. Search \"Pain\". During daytime hours, please page the attending first. At night please page the resident first.      "

## 2024-06-13 NOTE — PROGRESS NOTES
Chasity stated that she is feeling like she needs to poop. Provider notified and she was checked after straight catheterization. SVE 6/90/-2 at 1040. C-EFM, strip reviewed at bedside. Pitocin restarted at 2 units as per provider order at 1044. Continue to monitor and intervene as needed.    Chasity started to feel more pain at her right side than her left side. Anesthesia provider notified and she was assessed at bedside. Provider noticed that cathter has displaced. She is on her right side now and utilized the epidural bolus. Plan is to recheck her pain level in 30 minutes.

## 2024-06-13 NOTE — OP NOTE
[facility]  Full Operative Progress Note     Surgery Date:  2024    Surgeon:  Trisha Key MD    Assistants:  Parisa Duong MD, MPH, MS (PGY-3)                               Maia Robin MS4       Pre-op Diagnosis:    - Intrauterine pregnancy at 39w3d  - Obesity, BMI 38  - Category II tracing, remote from delivery      Post-op Diagnosis:    - Same   - Vigorous female infant   - thick meconium stained fluid    Procedure: Primary low-transverse  section with single layer uterine closure via pfannenstiel incision    Anesthesia: Spinal  EBL: 1499 ml   IVF: 1000 mL crystalloid  UOP: 800 mL clear urine at the end of the case  Drains: Hoffman Catheter   Specimens: Routine cord blood, cord segment  Complications: None apparent    Indications:   Christi Barrientos is a 28 year old  at 39w3d admitted for IOL to the CNM service in the setting of elevated BMI and decreased fetal movement. Her laboring course was complicated by persistent category II tracing unresponsive to intrauterine resuscitative measures. At this time the Ob service was consulted and a primary  section was recommended. The risks, benefits, and alternatives of  section were discussed with the patient including bleeding, infection, injury to surrounding structures (nerves, blood vessels, uterus, cervix, tubes, ovaries, bladder, bowel, rarely baby), and she agreed to proceed. Written consent obtained.     Findings:   No recto-fascial or intraabdominal adhesions   Mec-stained amniotic fluid  Female infant in LOP presentation. Apgars 3 at 1 minute, 6 at 5 minutes, and 8 at 10 minutes. Weight pending.   Latest Reference Range & Units 24 14:14   Ph Cord Arterial 7.16 - 7.39  7.17   PCO2 Cord Arterial 35 - 71 mm Hg 62   PO2 Cord Arterial 10 - 33 mm Hg <10 (L)   Bicarbonate Cord Arterial 16 - 24 mmol/L 23   Base Excess/Deficit >-10.0 - -2.0 mmol/L -7.5   Ph Cord Blood Venous 7.21 - 7.45  7.24    PCO2 Cord Venous 27 - 57 mm Hg 50   PO2 Cord Venous 21 - 37 mm Hg 16 (L)   Bicarbonate Cord Venous 16 - 24 mmol/L 22   Base Excess/Deficit Cord Venous >-10.0 - -2.0 mmol/L -6.5   (L): Data is abnormally low  Normal uterus, fallopian tubes, and ovaries.     Procedure Details:   The patient was brought to the OR, where adequate epidural anesthesia was administered/bolused.  She was placed in the dorsal supine position with a slight leftward tilt. She was prepped and draped in the usual sterile fashion. A surgical time out was performed. A pfannenstiel skin incision was made with the scalpel, and carried down to the underlying fascia with sharp and blunt dissection. The fascia was incised in the midline, and the incision was extended laterally with the Walker scissors. The superior aspect of the fascia was grasped with the Kocher clamps and dissected off of the underlying rectus muscles with blunt and sharp dissection. Attention was then turned to the inferior aspect of the fascia, which was similarly dissected off of the underlying rectus muscles. The rectus muscles were  in the midline, and the peritoneum was entered bluntly, and the opening was extended with digital pressure. The bladder blade was placed. A transverse hysterotomy was made with the scalpel in the lower uterine segment, and the incision was extended with digital pressure. Amniotomy was conducted with thick mec-stained fluid noted. The infant was noted to be in the LOP position, and was delivered atraumatically. The shoulders delivered easily. The baby had good tone but poor respiratory effort and no cry, so cord was doubly clamped and cut after 30 seconds, and the infant was handed off to the awaiting NICU staff. A segment of cord was cut and collected. The placenta was delivered with gentle traction on the umbilical cord and uterine massage. The uterus was left in situ and cleared of all clots and debris. Uterine tone was noted to be firm  however copious bleeding was noted from the hysterotomy. Pitocin was given through the running IV and uterine massage conducted. TXA was additionally provided to achieve hemostasis.  The hysterotomy was closed with a running locked suture of 0 Vicryl. The hysterotomy was noted to be hemostatic. The posterior cul-de-sac was cleared of all clots and debris. The uterus was returned to the abdomen. The pericolic gutters were cleared of all clots and debris. The hysterotomy was reexamined and noted to be hemostatic. The fascia and rectus muscles were examined and areas of oozing were controlled with electrocautery. The fascia was closed with a running 0 Vicryl suture. The subcutaneous tissue was irrigated and areas of oozing were controlled with electrocautery. The subcutaneous tissue was greater than 2 cm in thickness, and was therefore closed with 2-0 Plain gut in simple interrupted fashion. The skin was closed with 4-0 Monocryl and covered with overlying steri-strips and sterile dressing.    All sponge, needle, and instrument counts were correct. The patient tolerated the procedure well, and was transferred to recovery in stable condition. Dr. Key was present and scrubbed for the procedure.     Parisa Duong MD, MPH, MS  Obstetrics, Gynecology & Women's Health   Resident, PGY-3  06/13/2024 1:44 PM    I was scrubbed and present for entire procedure, agree with above operative note.    FAREED KEY MD

## 2024-06-13 NOTE — PROGRESS NOTES
"S:   Chasity had a restful night with epidural, noting some intermittent discomfort resolved with position changes. Remains on IUPC and FSE, discussed inadequate contraction strength and starting pitocin. ADONIS Cortés at bedside.     O:  Blood pressure 106/64, temperature 98.6  F (37  C), temperature source Oral, resp. rate 16, height 1.702 m (5' 7\"), weight 117 kg (258 lb), last menstrual period 2023, SpO2 93%.  General appearance: comfortable  CONTRACTIONS: Contractions every 3-5 minutes.    FETAL HEART TONES: baseline 130 with moderate FHR variability and  accelerations: yes. Decelerations: no.    PELVIC EXAM: PELVIC EXAM: 6/ 75%/ Mid/ soft/ -2  Fetal Position: ROP  Bloody show: No  Pitocin- plan to initiate  Antibiotics- none    Labor Course:  24  1445 triage assessment for latent labor and decreased fetal movement, pt requested to stay for IOL for BMI 38 in setting of decreased fetal movement, SVE 1.5/70/-2 intact  1715 Cook catheter placed  2300 Cook catheter out    24  0330 SROM with mec stained fluid, SVE 5.5/70/-2, FSE and IUPC placed due to difficulty monitoring    ASSESSMENT:  ==============  29yo  39w3d, here for IOL for BMI 38 in setting of decreased fetal movement   Active labor with minimal cervical change x 5 hours  Fetal Heart rate tracing category one  GBS- negative     PLAN:  ===========  Pain medication- epidural prn  Labor augmentation with Pitocin  ROP fetus, plan side lying release bilaterally for fetal rotation  Reevaluate in 2-4 hours/PRN     RAJESH Terry    I was present with the JAUN student who participated in the service and in the documentation of the services provided. I have verified the history and personally performed the physical exam and medical decision making, as documented by the student and edited by me.     Selin Pierce, AIDEN ZAMORANO          "

## 2024-06-13 NOTE — PLAN OF CARE
Goal Outcome Evaluation:  Data: Christi Barrientos transferred to 7120 via cart at 1700. Baby transferred via parent's arms. TAP block done in OR. Hoffman patent with clear urine buck care done.  Action: Receiving unit notified of transfer: Yes. Patient and family notified of room change. Report given to Tereza RN. Belongings sent to receiving unit. Accompanied by Registered Nurse. Oriented patient to surroundings. Call light within reach. ID bands double-checked with receiving RN.  Response: Patient tolerated transfer and is stable.  at bedside.

## 2024-06-13 NOTE — PROGRESS NOTES
"S: Chasity is doing well. She desired an epidural which was placed. She is just getting comfortable with that. We discussed after she is fully comfortable we will check on the cooks balloon. No questions or concerns at this time.     O:  Blood pressure 111/66, resp. rate 16, height 1.702 m (5' 7\"), weight 117 kg (258 lb), last menstrual period 2023, SpO2 97%.  General appearance: uncomfortable with contractions    CONTACTIONS: Contractions every 1-3 minutes.  Palpate: moderate  FETAL HEART TONES: baseline 135 with moderate FHR variability and    accelerations yes. Decelerations yes, one early deceleration.    NST: REACTIVE  ROM: not ruptured  PELVIC EXAM: deferred  Fetal Position: cephalic   Bloody show: No  Pitocin- none,  Antibiotics- none  Cervical ripening: cook catheter    ASSESSMENT:  ==============  IUP @ 39w2d IOL BMI 38   Fetal Heart rate tracing category one  GBS- negative     PLAN:  ===========  Pain medication with epidural, in place  Anticipate   Labor induction with cook catheter  Reevaluate in 2-4 hours/PRN     AIDEN Braun CNM    "

## 2024-06-13 NOTE — PROGRESS NOTES
Category II Fetal Tracing   Category II Algorithm    S:   I was notified by labor RN of persistent Category II fetal heart rate tracing for 20 minutes. Originally paged at 1108 with concern for late decels. CNM reviewed FHT remotely and FHT was Cat I with no decels, so paged back to continue care. Paged again at 1147 to come to room for decels. CNM came to unit and reviewed FHT with charge RN.     O:   Vitals:   Vitals:    06/13/24 1000 06/13/24 1030 06/13/24 1100 06/13/24 1130   BP: 129/66 136/87 129/61 115/66   BP Location: Left arm Left arm Left arm Left arm   Patient Position:  Sitting Right side Right side   Cuff Size:  Adult Large Adult Large Adult Large   Resp: 18  18    Temp: 98.4  F (36.9  C)  98.4  F (36.9  C)    TempSrc: Oral  Oral    SpO2: 97% 98% 98% 94%   Weight:       Height:         Fetal Baseline Rate: 135, normal  Variability: moderate  Accelerations: not present   Decelerations: present -  late.       Deceleration frequency: recurrent (occurring 50% or more)       Are the decelerations significant?   Yes.  Uterine Activity: every 2-3 minutes    Interventions to improve fetal oxygenation for a Category II tracing include : maternal positioning and oxytocin discontinued    A:   Persistent Category II tracing.     P:   Per the Category II algorithm, the plan is to observe and reevaluate in 40 minutes. Continue position changes, consider amnioinfusion if needed to assist in correcting FHT pattern.    AIDEN Arndt CNM

## 2024-06-13 NOTE — PROGRESS NOTES
Provider at bedside recommending caesarean section to the patient due to persistent category II strip. Patient is agreeable to the decision.

## 2024-06-14 ENCOUNTER — MEDICAL CORRESPONDENCE (OUTPATIENT)
Dept: HEALTH INFORMATION MANAGEMENT | Facility: CLINIC | Age: 29
End: 2024-06-14

## 2024-06-14 PROBLEM — E66.812 CLASS 2 OBESITY DUE TO EXCESS CALORIES WITHOUT SERIOUS COMORBIDITY WITH BODY MASS INDEX (BMI) OF 38.0 TO 38.9 IN ADULT: Status: ACTIVE | Noted: 2024-06-14

## 2024-06-14 PROBLEM — O13.9 GESTATIONAL HYPERTENSION: Status: ACTIVE | Noted: 2024-06-14

## 2024-06-14 PROBLEM — E66.09 CLASS 2 OBESITY DUE TO EXCESS CALORIES WITHOUT SERIOUS COMORBIDITY WITH BODY MASS INDEX (BMI) OF 38.0 TO 38.9 IN ADULT: Status: ACTIVE | Noted: 2024-06-14

## 2024-06-14 LAB
ALT SERPL W P-5'-P-CCNC: 7 U/L (ref 0–50)
AST SERPL W P-5'-P-CCNC: 15 U/L (ref 0–45)
CREAT SERPL-MCNC: 0.8 MG/DL (ref 0.51–0.95)
EGFRCR SERPLBLD CKD-EPI 2021: >90 ML/MIN/1.73M2
HGB BLD-MCNC: 7.8 G/DL (ref 11.7–15.7)
PLATELET # BLD AUTO: 218 10E3/UL (ref 150–450)

## 2024-06-14 PROCEDURE — 250N000013 HC RX MED GY IP 250 OP 250 PS 637

## 2024-06-14 PROCEDURE — 84460 ALANINE AMINO (ALT) (SGPT): CPT

## 2024-06-14 PROCEDURE — 120N000002 HC R&B MED SURG/OB UMMC

## 2024-06-14 PROCEDURE — 36415 COLL VENOUS BLD VENIPUNCTURE: CPT

## 2024-06-14 PROCEDURE — 85018 HEMOGLOBIN: CPT

## 2024-06-14 PROCEDURE — 82565 ASSAY OF CREATININE: CPT

## 2024-06-14 PROCEDURE — 250N000013 HC RX MED GY IP 250 OP 250 PS 637: Performed by: ADVANCED PRACTICE MIDWIFE

## 2024-06-14 PROCEDURE — 85049 AUTOMATED PLATELET COUNT: CPT

## 2024-06-14 PROCEDURE — 250N000011 HC RX IP 250 OP 636: Mod: JZ

## 2024-06-14 PROCEDURE — 84450 TRANSFERASE (AST) (SGOT): CPT

## 2024-06-14 RX ORDER — ACETAMINOPHEN AND CODEINE PHOSPHATE 120; 12 MG/5ML; MG/5ML
0.35 SOLUTION ORAL DAILY
Qty: 90 TABLET | Refills: 3 | Status: SHIPPED | OUTPATIENT
Start: 2024-06-14

## 2024-06-14 RX ORDER — FERROUS SULFATE 325(65) MG
325 TABLET ORAL
Qty: 60 TABLET | Refills: 0 | Status: SHIPPED | OUTPATIENT
Start: 2024-06-14 | End: 2024-08-13

## 2024-06-14 RX ADMIN — ACETAMINOPHEN 975 MG: 325 TABLET, FILM COATED ORAL at 13:39

## 2024-06-14 RX ADMIN — OXYCODONE HYDROCHLORIDE 5 MG: 5 TABLET ORAL at 14:35

## 2024-06-14 RX ADMIN — ACETAMINOPHEN 975 MG: 325 TABLET, FILM COATED ORAL at 07:23

## 2024-06-14 RX ADMIN — IBUPROFEN 800 MG: 800 TABLET, FILM COATED ORAL at 17:20

## 2024-06-14 RX ADMIN — ACETAMINOPHEN 975 MG: 325 TABLET, FILM COATED ORAL at 01:34

## 2024-06-14 RX ADMIN — SIMETHICONE 80 MG: 80 TABLET, CHEWABLE ORAL at 11:28

## 2024-06-14 RX ADMIN — ENOXAPARIN SODIUM 40 MG: 40 INJECTION SUBCUTANEOUS at 10:34

## 2024-06-14 RX ADMIN — PROPRANOLOL HYDROCHLORIDE 10 MG: 10 TABLET ORAL at 07:56

## 2024-06-14 RX ADMIN — DOCUSATE SODIUM AND SENNOSIDES 2 TABLET: 8.6; 5 TABLET, FILM COATED ORAL at 07:23

## 2024-06-14 RX ADMIN — SERTRALINE HYDROCHLORIDE 100 MG: 100 TABLET ORAL at 07:24

## 2024-06-14 RX ADMIN — IBUPROFEN 800 MG: 800 TABLET, FILM COATED ORAL at 23:50

## 2024-06-14 RX ADMIN — KETOROLAC TROMETHAMINE 30 MG: 30 INJECTION, SOLUTION INTRAMUSCULAR at 05:27

## 2024-06-14 RX ADMIN — SIMETHICONE 80 MG: 80 TABLET, CHEWABLE ORAL at 01:34

## 2024-06-14 RX ADMIN — OXYCODONE HYDROCHLORIDE 5 MG: 5 TABLET ORAL at 18:59

## 2024-06-14 RX ADMIN — ACETAMINOPHEN 975 MG: 325 TABLET, FILM COATED ORAL at 20:09

## 2024-06-14 RX ADMIN — IBUPROFEN 800 MG: 800 TABLET, FILM COATED ORAL at 11:28

## 2024-06-14 ASSESSMENT — ACTIVITIES OF DAILY LIVING (ADL)
ADLS_ACUITY_SCORE: 20
ADLS_ACUITY_SCORE: 21
ADLS_ACUITY_SCORE: 24
ADLS_ACUITY_SCORE: 20
ADLS_ACUITY_SCORE: 20
ADLS_ACUITY_SCORE: 21
ADLS_ACUITY_SCORE: 21
ADLS_ACUITY_SCORE: 20
ADLS_ACUITY_SCORE: 21
ADLS_ACUITY_SCORE: 20
ADLS_ACUITY_SCORE: 21
ADLS_ACUITY_SCORE: 20
ADLS_ACUITY_SCORE: 21

## 2024-06-14 NOTE — PLAN OF CARE
Goal Outcome Evaluation:       Pt VSS, except some elevated BP - MD aware.  Postpartum assessment WDL.  Pain managed with tylenol and toradol.  Pt is bonding well with baby and breastfeeding on cue.  C/s incision covered in MEME knight.  Pt has not ambulated yet.  Perales in place with concentrated urine, fluids running now and will remove perales once urine output has increased and more yellow and clear.   Continue with plan of care.         Problem: Adult Inpatient Plan of Care  Goal: Optimal Comfort and Wellbeing  Outcome: Progressing  Intervention: Monitor Pain and Promote Comfort  Recent Flowsheet Documentation  Taken 6/13/2024 2005 by Tereza Yuan RN  Pain Management Interventions: medication (see MAR)  Taken 6/13/2024 1900 by Tereza Yuan RN  Pain Management Interventions: medication (see MAR)  Intervention: Provide Person-Centered Care  Recent Flowsheet Documentation  Taken 6/13/2024 1900 by Tereza Yuan RN  Trust Relationship/Rapport:   care explained   choices provided   questions answered   questions encouraged   thoughts/feelings acknowledged

## 2024-06-14 NOTE — PROGRESS NOTES
Patient arrived to [facility] unit via zoom cart at 1700 ,with belongings, accompanied by spouse/ significant other, with infant in arms. Received report from  Violeta BOLANOS  and checked bands. Unit and room orientation completd. Call light given; no concerns present at this time. Continue with plan of care.

## 2024-06-14 NOTE — LACTATION NOTE
This note was copied from a baby's chart.  Consult for:  First time breastfeeding     Infant Name: Andra    Infant's Primary Care Clinic: [facility] (formerly [facility])    Delivery Information:  Andra was born at Gestational Age: 39w3d via   delivery on 2024 2:07 PM, AGA @ 6# 4 ounce birthweight, 20 hours old at time of visit.    Maternal Health History:    Information for the patient's mother:  Chasity Barrientos [[id number]]     Patient Active Problem List   Diagnosis    Depression with anxiety    Need for Tdap vaccination    Vaginal bleeding in pregnancy, third trimester     uterine contractions in third trimester, antepartum    Labor and delivery, indication for care    Class 2 obesity due to excess calories without serious comorbidity with body mass index (BMI) of 38.0 to 38.9 in adult    Gestational hypertension     and   Information for the patient's mother:  Chasity Barrientos [[id number]]     Medications Prior to Admission   Medication Sig Dispense Refill Last Dose    Ascorbic Acid (VITAMIN C) 500 MG CAPS    2024    FERROUS SULFATE PO Take 60 mg by mouth daily   2024    fluticasone (FLONASE) 50 MCG/ACT nasal spray Spray 1 spray into both nostrils daily   2024    hydrOXYzine (ATARAX) 25 MG tablet    More than a month    loratadine (CLARITIN) 10 MG tablet Take 10 mg by mouth daily   2024    Prenatal Vit-DSS-Fe Cbn-FA (PRENATAL AD PO)    2024    propranolol (INDERAL) 10 MG tablet Take 10 mg by mouth daily   2024    sertraline (ZOLOFT) 50 MG tablet    2024    Vitamin D3 (CHOLECALCIFEROL) 25 mcg (1000 units) tablet Take by mouth daily   2024    Misc. Devices (BREAST PUMP) MISC 1 each as needed (for expressing breast milk) 1 each 0       Inderal 10 mg daily: L2 per [facility] and Mothers' Milk, monitor for drowsiness, lethargy, pallor, poor feeding and weight gain.     Zoloft 50 mg daily: L2, monitor for sedation or irritability, not wake to feed or poor  feedings, and weight gain. One case reported of benign  sleep myoclonus @ 4 months of age (mom was taking, but unsure if related to sertraline). Another infant was reported to be somnolent with decreased tone, hearing problems and suspected developmental difficulties while mother was taking this medication.     Maternal Breast Exam:  Chasity noted breast growth and sensitivity in early pregnancy and has been leaking since early 3rd trimester, pumped several times at home says she has some syringes here in freezer. Her breasts are conical shaped, soft and symmetrical with bilateral intact, everted nipples. She was able to hand express colostrum during visit. ?    Oral exam of baby:  No oral exam done since latched on arrival, though did give drops of colostrum off spoon at end of visit and suck felt WNL.    Feeding History: Breastfeeding well, have been working on deeper latch    Feeding Assessment:  RN assisting with latch on arrival, they got baby on deep with sustained sucking rhythm and able to point out intermittent nutritive, deep and slower jaw pulls. Baby fed well on both sides and pulled off appear contented after, mom getting her on with very minimal (mostly verbal coaching) assistance second side.     Education:   [x] Expected  feeding patterns in the first few days (pg. 38 of Your Guide to To Postpartum and [place] Care)/ the Second Night  [x] Stages of milk production  [x] Benefits of and how to do hand expression of colostrum  [x] Early feeding cues     [x] Benefits of feeding on cue  [x] Benefits of skin to skin  [x] Breastfeeding positions  [x] Tips to get and maintain a deep latch  [x] Nutritive vs.non-nutritive sucking  [x] Gentle breast compressions as needed to enhance milk transfer  [x] How to tell when baby is finished  [x] How to tell if baby is getting enough  [x] Expected  output  [x] [place] weight loss  [x] Infant Feeding Log  [] [employer] Breastfeeding/Pumping  videos  [x] Signs breastfeeding is going well (comfortable latch, audible swallows, age appropriate output and weight loss)    [x] Tips to prevent engorgement  [x] Signs of engorgement  [x] Tips to manage engorgement  [x] Pumping: how to tell if good fit, safe and effective suction levels  [] [facility] breast pump part/infant feeding supplies cleaning recommendations  [x] Inpatient breastfeeding support  [] Outpatient lactation resources    Handouts: Infant Feeding Log (Week 1, Your Guide to Postpartum &  Care Book)    Home Breast Pump: Has been using her pump at home but forgot to bring it.     Plan: Continue breastfeeding on cue with RN support as needed, goal of 8-12 feedings per day.     Encourage frequent skin to skin and hand expression.     Encouraged follow up with outpatient lactation consultant  as needed after discharge. Family plans to follow up with [facility].    **Please review lactation resources prior to discharge, provider came in near end of visit and we did not get to that today.       Jasmyn Burleson, RN, IBCLC   Lactation Consultant  [place]: [facility] [phone]  Office: [phone]

## 2024-06-14 NOTE — PLAN OF CARE
Goal Outcome Evaluation:       VSS and postpartum assessment WDL. Pain adequately managed with tylenol and ibuprofen. Ambulating independently, tolerating regular diet. Hoffman removed and had one adequate measured void, due for second measured void. Breastfeeding independently with good latch and supplementing with expressed maternal milk pt brought from home. Bonding well with . Continue with plan of care.

## 2024-06-14 NOTE — ANESTHESIA POSTPROCEDURE EVALUATION
Patient: Christi Barrientos    Procedure: Procedure(s):   section       Anesthesia Type:  Epidural    Note:  Disposition: Inpatient   Postop Pain Control: Uneventful            Sign Out: Well controlled pain   PONV: No   Neuro/Psych: Uneventful            Sign Out: Acceptable/Baseline neuro status   Airway/Respiratory: Uneventful            Sign Out: Acceptable/Baseline resp. status   CV/Hemodynamics: Uneventful            Sign Out: Acceptable CV status; No obvious hypovolemia; No obvious fluid overload   Other NRE: NONE   DID A NON-ROUTINE EVENT OCCUR? No     Epidural-to- Updated ASA: 3 Emergent      Last vitals:  Vitals Value Taken Time   /83 24 1700   Temp 36.8  C (98.3  F) 24 1518   Pulse 83 24 1700   Resp 54 24 1654   SpO2 95 % 24 1700   Vitals shown include unfiled device data.    Electronically Signed By: Meka Cummings MD  2024  1:43 PM

## 2024-06-14 NOTE — PROVIDER NOTIFICATION
06/13/24 2203   Provider Notification   Provider Name/Title G2 Madeleine   Method of Notification Electronic Page   Request Evaluate-Remote   Notification Reason Other  (Is it okay to leave pt perales in for longer?  She has concentrated urine and her output is not the best.  I started fluids and would like to give her a little more time)

## 2024-06-14 NOTE — PLAN OF CARE
Goal Outcome Evaluation: progressing      Plan of Care Reviewed With: patient, spouse    Overall Patient Progress: improvingOverall Patient Progress: improving         VSS. PP assessments within normal range. Lungs clear.   Pain well controled with PO pain meds.   Hemoglobin is 7.8 this AM.   Denies dizziness or lightheaded. Up ad logan & voiding without difficulties.   Breast feeding well with staff assist.   Positively bonding with baby.

## 2024-06-14 NOTE — PROGRESS NOTES
Obstetrics Postpartum Progress Note  DOS: 24  MRN: [id number]    POD#1 after PLTCS     S: Patient states she is doing well.  Pain controlled with current pain meds. Lochia is minimum.  Initial nausea postoperative but now eating and drinking without nausea/vomiting.  She is passing flatus. Ambulating without difficulty.  Voiding without difficulty.  Denies headaches, vision changes, chest pain, SOB.  Working on breast feeding. No other acute concerns  O:  Vitals:    24 2150 24 2321 24 0000 24 0121   BP: 128/74   119/76   BP Location:    Left arm   Patient Position:    Semi-Al's   Cuff Size:    Adult Regular   Pulse: 66   76   Resp: 16   16   Temp: 97.8  F (36.6  C)      TempSrc: Oral   Oral   SpO2: 94% 98% 95% 97%   Weight:       Height:           Gen:  NAD  CV: Regular rate, well perfused  Resp: Nonlabored on room air, normal inspiratory effort  Abd: soft, nondistended, nontender, fundus firm at 1cm below umbilicus  Incision: Bandage in place, no shadowing clean, dry, intact around edges  Ext: 1+ edema    Urine output: 1000 over 9 hours    Weight:  Vitals:    24 1402   Weight: 117 kg (258 lb)         Labs:  Hemoglobin   Date Value Ref Range Status   2024 11.8 11.7 - 15.7 g/dL Final   2024 12.0 11.7 - 15.7 g/dL Final   2015 12.9 11.7 - 15.7 g/dL Final       Rubella Antibody IgG   Date Value Ref Range Status   2023 Positive  Final     Comment:     Suggests previous exposure or immunization and probable immunity.       Assessment and Plan: 28 year old  POD#1 s/p PLTCS, doing well postpartum. Patient is a VANESA from [facility] CNM service.  Pregnancy was complicated by obesity.    Routine postpartum:  Heme: Hgb 11.8 > EBL 1494 > AM hgb epnding. No symptoms of ABLA. Will discharge home w/ PO iron if Hgb under 10.  Pain: well-controlled with tylenol, ibuprofen and oxycodone prn, continue.  Rh pos/Rubella immune. No intervention required.  Feeding: Working on  breastfeeding  :  s/p perales, voiding spontaneously  PPX: Encourage ambulation, lovenox ppx pending AM hgb.   Continue regular diet, scheduled bowel regimen, prn antiemetics  Contraception: POPs PTD. Discussed adequate pregnancy spacing of 18 months    gHTN  -MRBP x1 ovn  -HELLP nl (6/12), repeat pending  -Pre-symptoms: none   -Add long-acting antihypertensives prn  -IV anti-hypertensive for SRBP prn.   [facility]. Plan for 2-3 days blood pressure check.     Dispo: Anticipate discharge home POD#2-3    Lorin Salvador MD MPH  Obstetric & Gynecology, PGY-2  June 14, 2024 , 7:54 AM      Attestation:   This patient was seen and evaluated by me, separately from the house staff team. I have reviewed the note/plan above and agree.     Tearful but appropriate. Discussed routine cares and probable discharge home on POD#3. Answered questions.   Hemoglobin   Date Value Ref Range Status   06/14/2024 7.8 (L) 11.7 - 15.7 g/dL Final   06/13/2015 12.9 11.7 - 15.7 g/dL Final   ]  Acute blood loss anemia expected from surgery. Will plan IV Fe if asymptomatic.     Julia Downey MD

## 2024-06-14 NOTE — PROVIDER NOTIFICATION
06/13/24 1912   Provider Notification   Provider Name/Title G2 Madeleine   Method of Notification Electronic Page   Request Evaluate-Remote   Notification Reason Medication Request  (Pt is requesting benadryl for itching/allergies.  Can you please put in that order?)

## 2024-06-14 NOTE — PLAN OF CARE
[id number]  Vital signs within normal limits. Postpartum checks within normal limits. Patient eating and drinking normally. Patient able to empty bladder independently and is up ambulating. Adequate pain control.  Patient performing self cares and is able to care for infant. Breastfeeding on cue, requires no assist. Positive attachment behaviors observed with infant. Support person present and very supportive. Will continue to assess/assist as needed.

## 2024-06-15 LAB
HGB BLD-MCNC: 7.9 G/DL (ref 11.7–15.7)
HOLD SPECIMEN: NORMAL

## 2024-06-15 PROCEDURE — 258N000003 HC RX IP 258 OP 636: Mod: JZ

## 2024-06-15 PROCEDURE — 250N000013 HC RX MED GY IP 250 OP 250 PS 637: Performed by: ADVANCED PRACTICE MIDWIFE

## 2024-06-15 PROCEDURE — 36415 COLL VENOUS BLD VENIPUNCTURE: CPT

## 2024-06-15 PROCEDURE — 85018 HEMOGLOBIN: CPT

## 2024-06-15 PROCEDURE — 250N000013 HC RX MED GY IP 250 OP 250 PS 637

## 2024-06-15 PROCEDURE — 120N000002 HC R&B MED SURG/OB UMMC

## 2024-06-15 PROCEDURE — 250N000011 HC RX IP 250 OP 636: Mod: JZ

## 2024-06-15 RX ORDER — METHYLPREDNISOLONE SODIUM SUCCINATE 125 MG/2ML
125 INJECTION, POWDER, LYOPHILIZED, FOR SOLUTION INTRAMUSCULAR; INTRAVENOUS
Status: DISCONTINUED | OUTPATIENT
Start: 2024-06-15 | End: 2024-06-16 | Stop reason: HOSPADM

## 2024-06-15 RX ORDER — SODIUM CHLORIDE 9 MG/ML
INJECTION, SOLUTION INTRAVENOUS
Status: DISPENSED
Start: 2024-06-15 | End: 2024-06-16

## 2024-06-15 RX ORDER — DIPHENHYDRAMINE HYDROCHLORIDE 50 MG/ML
50 INJECTION INTRAMUSCULAR; INTRAVENOUS
Status: DISCONTINUED | OUTPATIENT
Start: 2024-06-15 | End: 2024-06-16 | Stop reason: HOSPADM

## 2024-06-15 RX ADMIN — ENOXAPARIN SODIUM 40 MG: 40 INJECTION SUBCUTANEOUS at 09:42

## 2024-06-15 RX ADMIN — OXYCODONE HYDROCHLORIDE 5 MG: 5 TABLET ORAL at 12:36

## 2024-06-15 RX ADMIN — OXYCODONE HYDROCHLORIDE 5 MG: 5 TABLET ORAL at 17:48

## 2024-06-15 RX ADMIN — ACETAMINOPHEN 975 MG: 325 TABLET, FILM COATED ORAL at 14:05

## 2024-06-15 RX ADMIN — OXYCODONE HYDROCHLORIDE 5 MG: 5 TABLET ORAL at 01:57

## 2024-06-15 RX ADMIN — SIMETHICONE 80 MG: 80 TABLET, CHEWABLE ORAL at 12:34

## 2024-06-15 RX ADMIN — SODIUM CHLORIDE 500 MG: 9 INJECTION, SOLUTION INTRAVENOUS at 22:39

## 2024-06-15 RX ADMIN — PROPRANOLOL HYDROCHLORIDE 10 MG: 10 TABLET ORAL at 08:02

## 2024-06-15 RX ADMIN — ACETAMINOPHEN 975 MG: 325 TABLET, FILM COATED ORAL at 01:57

## 2024-06-15 RX ADMIN — IBUPROFEN 800 MG: 800 TABLET, FILM COATED ORAL at 05:45

## 2024-06-15 RX ADMIN — ACETAMINOPHEN 975 MG: 325 TABLET, FILM COATED ORAL at 20:12

## 2024-06-15 RX ADMIN — SERTRALINE HYDROCHLORIDE 100 MG: 100 TABLET ORAL at 08:01

## 2024-06-15 RX ADMIN — SODIUM CHLORIDE 25 MG: 9 INJECTION, SOLUTION INTRAVENOUS at 20:12

## 2024-06-15 RX ADMIN — ACETAMINOPHEN 975 MG: 325 TABLET, FILM COATED ORAL at 08:02

## 2024-06-15 RX ADMIN — IBUPROFEN 800 MG: 800 TABLET, FILM COATED ORAL at 12:34

## 2024-06-15 RX ADMIN — IBUPROFEN 800 MG: 800 TABLET, FILM COATED ORAL at 18:43

## 2024-06-15 ASSESSMENT — ACTIVITIES OF DAILY LIVING (ADL)
ADLS_ACUITY_SCORE: 20

## 2024-06-15 NOTE — PLAN OF CARE
Goal Outcome Evaluation:         Data: Vital signs within normal limits. Postpartum checks within normal limits - see flow record. Patient eating and drinking normally. Patient able to empty bladder independently and is up ambulating. No apparent signs of infection. Incision healing well. Patient performing self cares and is able to care for infant.  Action: Patient medicated during the shift for pain. See MAR. Patient reassessed within 1 hour after each medication and pain was improved - patient stated she was comfortable. Patient education done about breastfeeding, infant cares. See flow record.  Response: Positive attachment behaviors observed with infant. Support persons Moo present.   Plan: Anticipate discharge on 6/16.

## 2024-06-15 NOTE — PROGRESS NOTES
"Post  Anesthesia Follow Up Note    Patient: Christi Barrientos    Patient location: Postpartum floor.    Chief complaint: Acute postoperative pain management s/p intrathecal analgesic administration     Procedure(s) Performed:  Procedure(s):   section    Anesthesia type: Spinal Block    Subjective:     Pain Control: Adequate    Additional ROS:  She does not complain of pruritis at this time. She denies weakness, denies paresthesia, denies difficulties breathing or voiding, denies nausea or vomiting. She is able to ambulate and tolerates regular diet.    Objective:    Respiratory Function (RR / SpO2 / Airway Patency): Satisfactory    Cardiac Function (HR / Rhythm / BP): Satisfactory    Strength and sensation lower extremities: Normal    Site of spinal/epidural insertion: No signs of infection or inflammation.     Last Vitals: /79 (BP Location: Left arm)   Pulse 78   Temp 36.7  C (98.1  F) (Oral)   Resp 16   Ht 1.702 m (5' 7\")   Wt 121.5 kg (267 lb 13.7 oz)   LMP 2023   SpO2 100%   Breastfeeding Unknown   BMI 41.95 kg/m      Assessment and plan:   Christi Barrientos is a 28 year old female  POD #1 s/p   with IT bupivacaine, fentanyl and morphine; and single shot TAP nerve block injections.  Pt is ambulating without difficulty, no weakness or paresthesias.  There is no evidence of adverse side effects associated with spinal and nerve block injections.  The patient is receiving adequate incisional pain control at this time and anticipate up to 72 hours of incisional pain control.  However, we further anticipate that the patient will require opioid/nonopioid analgesics for visceral and muscle pain that is not controlled with local anesthetic.      In brief summary, her post-operative analgesia is adequate today. Further interventional analgesic strategies would be of little utility at this time. Thus, we recommend proceeding with PO analgesics.    Thank you for " including us in the care for this patient.    Flores Melendez DO  Anesthesiology Resident, CA-1, PGY-2

## 2024-06-15 NOTE — PLAN OF CARE
Goal Outcome Evaluation: Progressing      Plan of Care Reviewed With: patient, spouse    Overall Patient Progress: improvingOverall Patient Progress: improving         Data: Vital signs within normal limits. Postpartum checks within normal limits - see flow record. Patient eating and drinking normally. Patient able to empty bladder independently and is up ambulating. No apparent signs of infection. Incision healing well. Patient performing self cares and is able to care for infant.  Action: Patient medicated during the shift for pain and cramping. See MAR. Patient reassessed within 1 hour after each medication and pain was improved - patient stated she was comfortable. Patient education done about Elevating legs, resting between cares & applying Hydrogels on Nipples.   Response: Positive attachment behaviors observed with infant. Support persons  present.   Plan: Anticipate discharge on 6/16/24.

## 2024-06-15 NOTE — PROGRESS NOTES
Obstetrics Postpartum Progress Note  DOS: 06/15/24  MRN: [id number]    POD#2 after PLTCS     S: Patient states she is doing well.  Pain controlled with current pain meds. Lochia is minimum.  Denies Nausea/Vomiting.  She is passing flatus. Ambulating without difficulty.  Voiding without difficulty.  Denies headaches, vision changes, chest pain, SOB. No other acute concerns  O:  Vitals:    24 1132 24 1339 24 2049 06/15/24 0201   BP:  108/73 123/86 133/85   BP Location:  Left arm Left arm    Patient Position:  Semi-Al's Semi-Al's    Cuff Size:  Adult Regular Adult Regular    Pulse:  90 93 78   Resp:  16 16 16   Temp:  98.3  F (36.8  C) 98.4  F (36.9  C) 98.1  F (36.7  C)   TempSrc:  Oral Oral Oral   SpO2:       Weight: 121.5 kg (267 lb 13.7 oz)      Height:         Gen:  NAD  CV: Regular rate, well perfused  Resp: Nonlabored on room air, normal inspiratory effort  Abd: soft, nondistended, nontender, fundus firm at 1cm below umbilicus  Incision: c/d/I incision with steri strips  Ext: 1+ edema      Intake/Output Summary (Last 24 hours) at 6/15/2024 0653  Last data filed at 2024 0915  Gross per 24 hour   Intake 300 ml   Output 800 ml   Net -500 ml          Weight:  Vitals:    24 1402 24 1132   Weight: 117 kg (258 lb) 121.5 kg (267 lb 13.7 oz)     Labs:  Hemoglobin   Date Value Ref Range Status   2024 7.8 (L) 11.7 - 15.7 g/dL Final   2024 11.8 11.7 - 15.7 g/dL Final   2015 12.9 11.7 - 15.7 g/dL Final       Rubella Antibody IgG   Date Value Ref Range Status   2023 Positive  Final     Comment:     Suggests previous exposure or immunization and probable immunity.     Assessment and Plan: 28 year old  POD#2 s/p PLTCS, doing well postpartum. Patient is a VANESA from [facility] CNM service. Meeting discharge milestone.    Routine postpartum:  Heme: Hgb 11.8 > EBL 1494 >7.8> repeat AM hg pending. No symptoms of ABLA. Will discharge home w/ PO iron if Hgb under  10.  Pain: well-controlled with tylenol, ibuprofen and oxycodone prn, continue.  Rh pos/Rubella immune. No intervention required.  Feeding: Working on breastfeeding  :  s/p perales, voiding spontaneously  PPX: Encourage ambulation, lovenox  Continue regular diet, scheduled bowel regimen, prn antiemetics  Contraception: POPs PTD. Discussed adequate pregnancy spacing of 18 months    gHTN  -normotensive ovn  -HELLP nl (6/14)  -Pre-symptoms: none   -Add long-acting antihypertensives prn  -IV anti-hypertensive for SRBP prn.   -Interested in the [facility]BP program for discharge. Plan for 2-3 days blood pressure check.     Dispo: Anticipate discharge home this afternoon     Carlos Howe DO, MA  [facility]- PGY3  6:55 AM Qing 15, 2024

## 2024-06-15 NOTE — PLAN OF CARE
VSS and postpartum assessments WDL.  Up ad logan with steady gait and independent with cares.  Bonding well with infant.  Breastfeeding on cue independently.  Pain managed with tylenol, ibuprofen, and oxycodone.  nicanor present and supportive.  Will continue with postpartum cares and education per plan of care.      Plan of Care Reviewed With: patient    Overall Patient Progress: improvingOverall Patient Progress: improving

## 2024-06-15 NOTE — LACTATION NOTE
This note was copied from a baby's chart.  Follow Up Consult    Infant Name: Andra    Infant's Primary Care Clinic: [facility]    Maternal Assessment: Breasts soft, symmetric, conical shaped with more breast tissue above than below areolae. Nipples everted and sore bilaterally, tiny linear scab at 12 o'clock on right and 1-2 mm rounded scab at top of left nipple face.       Infant Assessment:  Andra has age appropriate output and weight loss.  Oral exam done today: she has very mildly recessed lower jaw, normal arch to palate. Suck was disorganized on finger at first with tongue falling behind lower gum ridge when every suck. After a minute or so of sucking though, with finger encouraging tongue down and forward, then traction outward with her pulling finger back in well, she organized suck and extended tip of tongue 4-5 mm beyond lower gum ridge when sucking and was able to create good seal with good cupping of tongue at sides as well. Tongue palpable 4-5 mm beyond point of attachment of a stronger, fibrous lingual frenulum (though this was with tongue retracted in mouth), stretches well when she extends with sucking. Noted tongue out past lower lip once as well though pointed downward, over lip instead of straight out.     Weight Change Since Birth: -5.6% at 24 hours old      Feeding History: Mom reports breastfeeding going well though she feels she is not getting enough into baby's mouth because it is painful and sore when she is sucking, now has scabs on nipple bilaterally.      Feeding Assessment: just missed feeding, left Vocera number on whiteboard for parents to call from house phone with next feeding cues.     Education:   [] Expected  feeding patterns in the first few days (pg. 38 of Your Guide to To Postpartum and [place] Care)/ the Second Night  [] Stages of milk production  [] Benefits of hand expression of colostrum  [] Early feeding cues     [] Benefits of feeding on cue  []  "Benefits of skin to skin  [] Breastfeeding positions  [] Tips to get and maintain a deep latch  [] Nutritive vs.non-nutritive sucking  [] Gentle breast compressions as needed to enhance milk transfer  [] How to tell when baby is finished  [x] How to tell if baby is getting enough  [x] Expected  output  [x]  weight loss  [] Infant Feeding Log  [] [employer] Breastfeeding/Pumping videos  [x] Signs breastfeeding is going well (comfortable latch, audible swallows, age appropriate output and weight loss)    [x] Tips to prevent engorgement  [x] Signs of engorgement  [x] Tips to manage engorgement  [] Pumping recommendations (based on patient need)  [] [facility] breast pump part/infant feeding supplies cleaning recommendations  [x] Inpatient breastfeeding support  [x] Outpatient lactation resources    Reviewed Andra's oral exam with parents, reassure she does well and feels mostly WNL though possibly borderline attachment of her lingual frenulum. Encouraged continue working to help her get deep, comfortable latch and review if/when to ask for provider evaluation of suck again: if she fatigues with extending her tongue discussed ways to tell that she's transferring well, emptying breast and getting enough to eat. Encouraged if taking longer than an hour to feed each time or prolonged cluster feeding especially beyond the early weeks, breasts not feeling softened well or \"emptied\" and Andra not satisfied after feeds, if weight or diapers less than expected for age, nipple creased and/or damaged despite deep latch.    Handouts: Infant Feeding Log (Week 1, Your Guide to Postpartum &  Care Book) and [facility] Lactation Resources    Home Breast Pump: has at home    Plan:    Encouraged follow up with outpatient lactation consultant  within 1 week after discharge (first time breastfeeding, latching well though sore and scabbed nipples on day two). Family plans to follow up with [facility].   "         Jasmyn Burleson, RN, IBCLC   Lactation Consultant  [place]: [facility] [phone]  Office: [phone]

## 2024-06-16 VITALS
RESPIRATION RATE: 18 BRPM | OXYGEN SATURATION: 99 % | HEART RATE: 77 BPM | WEIGHT: 259.6 LBS | HEIGHT: 67 IN | TEMPERATURE: 97.9 F | SYSTOLIC BLOOD PRESSURE: 128 MMHG | BODY MASS INDEX: 40.74 KG/M2 | DIASTOLIC BLOOD PRESSURE: 77 MMHG

## 2024-06-16 PROCEDURE — 250N000011 HC RX IP 250 OP 636: Mod: JZ

## 2024-06-16 PROCEDURE — 250N000013 HC RX MED GY IP 250 OP 250 PS 637

## 2024-06-16 PROCEDURE — 250N000013 HC RX MED GY IP 250 OP 250 PS 637: Performed by: ADVANCED PRACTICE MIDWIFE

## 2024-06-16 RX ORDER — DOCUSATE SODIUM 100 MG/1
100 CAPSULE, LIQUID FILLED ORAL DAILY
Status: DISCONTINUED | OUTPATIENT
Start: 2024-06-16 | End: 2024-06-16 | Stop reason: HOSPADM

## 2024-06-16 RX ORDER — ACETAMINOPHEN 325 MG/1
650 TABLET ORAL EVERY 6 HOURS PRN
Qty: 100 TABLET | Refills: 0 | Status: SHIPPED | OUTPATIENT
Start: 2024-06-16 | End: 2024-07-29

## 2024-06-16 RX ORDER — IBUPROFEN 600 MG/1
600 TABLET, FILM COATED ORAL EVERY 6 HOURS PRN
Qty: 60 TABLET | Refills: 0 | Status: SHIPPED | OUTPATIENT
Start: 2024-06-16 | End: 2024-07-29

## 2024-06-16 RX ORDER — AMOXICILLIN 250 MG
1 CAPSULE ORAL DAILY
Qty: 100 TABLET | Refills: 0 | Status: SHIPPED | OUTPATIENT
Start: 2024-06-16 | End: 2024-07-01

## 2024-06-16 RX ORDER — OXYCODONE HYDROCHLORIDE 5 MG/1
5 TABLET ORAL EVERY 6 HOURS PRN
Qty: 12 TABLET | Refills: 0 | Status: SHIPPED | OUTPATIENT
Start: 2024-06-16 | End: 2024-06-19

## 2024-06-16 RX ADMIN — IBUPROFEN 800 MG: 800 TABLET, FILM COATED ORAL at 07:28

## 2024-06-16 RX ADMIN — IBUPROFEN 800 MG: 800 TABLET, FILM COATED ORAL at 00:28

## 2024-06-16 RX ADMIN — PROPRANOLOL HYDROCHLORIDE 10 MG: 10 TABLET ORAL at 08:12

## 2024-06-16 RX ADMIN — OXYCODONE HYDROCHLORIDE 5 MG: 5 TABLET ORAL at 08:04

## 2024-06-16 RX ADMIN — ACETAMINOPHEN 975 MG: 325 TABLET, FILM COATED ORAL at 03:54

## 2024-06-16 RX ADMIN — OXYCODONE HYDROCHLORIDE 5 MG: 5 TABLET ORAL at 15:01

## 2024-06-16 RX ADMIN — ENOXAPARIN SODIUM 40 MG: 40 INJECTION SUBCUTANEOUS at 12:10

## 2024-06-16 RX ADMIN — IBUPROFEN 800 MG: 800 TABLET, FILM COATED ORAL at 15:01

## 2024-06-16 RX ADMIN — ACETAMINOPHEN 975 MG: 325 TABLET, FILM COATED ORAL at 12:10

## 2024-06-16 RX ADMIN — SERTRALINE HYDROCHLORIDE 100 MG: 100 TABLET ORAL at 08:03

## 2024-06-16 ASSESSMENT — ACTIVITIES OF DAILY LIVING (ADL)
ADLS_ACUITY_SCORE: 20

## 2024-06-16 NOTE — PROGRESS NOTES
Obstetrics Postpartum Progress Note  DOS: 06/15/24  MRN: [id number]    POD#3 after PLTCS     S: Patient states she is doing well.  Pain controlled with current pain meds. Lochia is minimum.  Denies Nausea/Vomiting.  She is passing flatus. Ambulating without difficulty.  Voiding without difficulty.  Denies headaches, vision changes, chest pain, SOB. No other acute concerns  O:  Vitals:    24 0200 24 0230 24 0726 24 0812   BP: 112/78 114/81 130/82 128/77   BP Location: Left arm Left arm Left arm Left arm   Patient Position: Semi-Al's Semi-Al's Sitting Sitting   Cuff Size: Adult Regular Adult Regular Adult Regular Adult Large   Pulse: 83 82 85 77   Resp: 18 16 16 18   Temp: 98  F (36.7  C) 98.2  F (36.8  C)  97.9  F (36.6  C)   TempSrc: Oral Oral  Oral   SpO2: 99%      Weight:       Height:         Gen:  NAD  CV: Regular rate, well perfused  Resp: Nonlabored on room air, normal inspiratory effort  Abd: soft, nondistended, nontender, fundus firm at 1cm below umbilicus  Incision: c/d/I incision with steri strips  Ext: 1+ edema     Weight:  Vitals:    24 1402 24 1132   Weight: 117 kg (258 lb) 121.5 kg (267 lb 13.7 oz)     Labs:  Hemoglobin   Date Value Ref Range Status   06/15/2024 7.9 (L) 11.7 - 15.7 g/dL Final   2024 7.8 (L) 11.7 - 15.7 g/dL Final   2015 12.9 11.7 - 15.7 g/dL Final       Rubella Antibody IgG   Date Value Ref Range Status   2023 Positive  Final     Comment:     Suggests previous exposure or immunization and probable immunity.     Assessment and Plan: 28 year old  POD#3 s/p PLTCS, doing well postpartum. Patient is a V[facility]ESA from [facility] CNM service. Meeting discharge milestone yesterday, however remained inpatient due to elevated blood pressures necessitating further observation    Routine postpartum:  Heme: Hgb 11.8 > EBL 1494 >7.8> 7.9> IV Fe. No symptoms of ABLA. Will discharge home w/ PO iron if Hgb under 10.  Pain: well-controlled with  tylenol, ibuprofen and oxycodone prn, continue.  Rh pos/Rubella immune. No intervention required.  Feeding: Working on breastfeeding  :  s/p perales, voiding spontaneously  PPX: Encourage ambulation, lovenox  Continue regular diet, scheduled bowel regimen, prn antiemetics  Contraception: POPs PTD. Discussed adequate pregnancy spacing of 18 months    gHTN  -normotensive ovn  -HELLP nl (6/14)  -Pre-symptoms: none   -IV anti-hypertensive for SRBP prn.   -Interested in the [facility]-BP program for discharge. Plan for 2-3 days blood pressure check.     Dispo: Anticipate discharge home this afternoon     Parisa Duong MD, MPH, MS  Obstetrics, Gynecology & Women's Health   Resident, PGY-3  06/16/2024 9:14 AM     Attestation:   This patient was seen and evaluated by me, separately from the house staff team. I have reviewed the note/plan above and agree.     Doing well and using oxy for pain control, #12 tablets done for discharge. Discharge instructions reviewed  and plan [facility] BP program for follow up. No need for clinic appt. Will see CNM for pp visit in 6-8 weeks.   Iron for acute blood loss anemia expected from surgery, asymptomatic.     Julia Downey MD

## 2024-06-16 NOTE — PLAN OF CARE
Goal Outcome Evaluation:      Plan of Care Reviewed With: patient, spouse    Overall Patient Progress: improvingOverall Patient Progress: improving    Postpartum assessments WDL. VSS. Pain managed with scheduled Tylenol and Ibuprofen. Fundus firm, midline and below the U. Scant amounts of lochia with no clots or odor present. Incision SHY, closed with steri strips, no drainage present. IV iron infused per orders. Breastfeeding infant Q2-3H during the night. Mother began supplementing donor milk to infant due to infant being increasingly fussy and cluster feeding for >1hr. Education given to mom on beginning to pump if infant is still requiring supplementation during the day. Spouse present at bedside, supportive and involved with cares. Call light within reach. Continue with plan of care.    Thuy Justin RN

## 2024-06-16 NOTE — DISCHARGE INSTRUCTIONS
Warning Signs after Having a Baby    Keep this paper on your fridge or somewhere else where you can see it.    Call your provider if you have any of these symptoms up to 12 weeks after having your baby.    Thoughts of hurting yourself or your baby  Pain in your chest or trouble breathing  Severe headache not helped by pain medicine  Eyesight concerns (blurry vision, seeing spots or flashes of light, other changes to eyesight)  Fainting, shaking or other signs of a seizure    Call 9-1-1 if you feel that it is an emergency.     The symptoms below can happen to anyone after giving birth. They can be very serious. Call your provider if you have any of these warning signs.    My provider s phone number: _______________________    Losing too much blood (hemorrhage)    Call your provider if you soak through a pad in less than an hour or pass blood clots bigger than a golf ball. These may be signs that you are bleeding too much.    Blood clots in the legs or lungs    After you give birth, your body naturally clots its blood to help prevent blood loss. Sometimes this increased clotting can happen in other areas of the body, like the legs or lungs. This can block your blood flow and be very dangerous.     Call your provider if you:  Have a red, swollen spot on the back of your leg that is warm or painful when you touch it.   Are coughing up blood.     Infection    Call your provider if you have any of these symptoms:  Fever of 100.4 F (38 C) or higher.  Pain or redness around your stitches if you had an incision.   Any yellow, white, or green fluid coming from places where you had stitches or surgery.    Mood Problems (postpartum depression)    Many people feel sad or have mood changes after having a baby. But for some people, these mood swings are worse.     Call your provider right away if you feel so anxious or nervous that you can't care for yourself or your baby.    Preeclampsia (high blood pressure)    Even if you  didn't have high blood pressure when you were pregnant, you are at risk for the high blood pressure disease called preeclampsia. This risk can last up to 12 weeks after giving birth.     Call your provider if you have:   Pain on your right side under your rib cage  Sudden swelling in the hands and face    Remember: You know your body. If something doesn't feel right, get medical help.     For informational purposes only. Not to replace the advice of your health care provider. Copyright 2020 [place] [employer] [facility]. All rights reserved. Clinically reviewed by Kayli Cervantes, RNC-OB, MSN. [employer] [id number] - Rev 02/23.      Checking Your Blood Pressure at Home  During and after pregnancy  How do I measure my blood pressure?  It s important to take the readings at the same time each day, such as morning and evening. Take your blood pressure before taking any morning medications.  How to get the most accurate reading  30 minutes before checking your blood pressure, avoid the following:  Drinking caffeine  Drinking alcohol  Eating  Smoking  Exercising  5 minutes before checking your blood pressure:  Use the bathroom and urinate so you have an empty bladder.  Sit still in a chair for around 5 minutes. Stay calm and relaxed and do not talk if possible.  To check your blood pressure:     Sit up straight in a chair.  Place your feet on the floor. Don t cross your ankles or legs.  Rest your arm at the level of your heart on a table or desk or on the arm of a chair. Use the same arm every day.  Pull up your shirt sleeve. Don t take the measurement over clothes.  Wrap the blood pressure cuff around the upper part of your left arm, 1 inch (2.5 cm) above your elbow.  Fit the cuff snugly around your arm. You should be able to place only one finger between the cuff and your arm.  Position the cord so that it rests in the bend of your elbow.  Press the power button.  Sit quietly while the cuff inflates and deflates.  Read the  digital reading on the monitor screen and write the numbers down (record them) in a notebook.  Wait 2-3 minutes, then repeat the steps, starting at step 1.  Which features do you need?  Arm cuff monitors give the most exact readings.  Wrist and finger blood pressure monitors are often less exact.  Pick a blood pressure monitor that has passed tests to show they measure exactly. Blood pressure cuffs for sale in the [place] that have passed tests are listed on the website www.validatebp.org.  Some monitors that have passed tests are:  Omron 3 Series Upper Arm Blood Pressure Monitor (Model GC4151)  Omron 5 Series Upper Arm Blood Pressure Monitor (Model IJ9540)  Omron 7 Series Upper Arm Blood Pressure Monitor (Model HEM-7320)  A[employer]D Medical Upper Arm Blood Pressure Monitor with Talking Function (UA 1030T)  Don t use smartphone apps. There are many smartphone apps that claim to check your blood pressure using the pulse in your wrist or finger. These don t work. They haven t passed any tests. Don t give your clinic a blood pressure reading from a smartphone emma.  If you have a flexible spending account (FSA) or health savings account (HSA), you may wish to pay yourself back (reimburse) for the machine and cuff. A blood pressure monitor is an allowed over-the- counter (OTC) item to pay yourself back from these accounts.  Cuff size  The size of the arm cuff is a key feature. Make sure the cuff is the right size for your arm. If the cuff isn t the right size, readings will either be too high or low.  To know what size cuff to buy, measure the distance around your bicep (upper arm).  Use a flexible measuring tape or . Place the measuring tape detention between your armpit and elbow. Measure the distance around your arm in inches.  You may need to buy a cuff apart from the machine to get the right size.  Cuff sizes and arm measurements  Small adult: 22 to 26 cm (8.7 to 10.2 inches)  Adult: 27 to 34 cm (10.6 to 13.4  "inches)  Large adult: 35 to 44 cm (13.8 to 17.3 inches)  Adult thigh: 45 to 52 cm (17.7 to 20.5 inches)    Copyright statement\" content=\"For informational purposes only. Not to replace the advice of your health care provider. Photo: ID [id number]   Fer  LikeLike.com.com. Text copyright   2023 [facility]. All rights reserved. Clinically reviewed by [facility]. [employer]zone[employer] 613335 - REV 03/23.  Know Your Blood Pressure Numbers  For patients who've had a high blood pressure disorder of pregnancy  What to know about high blood pressure disorders of pregnancy  People who had high blood pressure during pregnancy may continue to have high blood pressure for up to 12 weeks after pregnancy. It can also raise your lifetime risk of chronic high blood pressure, heart disease and blood vessel disease. It is vital that you keep monitoring your blood pressure and taking steps to control it.   The general guidelines below are what your blood pressures mean.  A heart healthy lifestyle that includes blood pressure control can help reduce these risks. A good blood pressure goal is less than 130/80 mmHg long-term.  Talk to your provider about high blood pressure disorder of pregnancy and what this means for your lifelong health.   Know your numbers  Read \"Checking Your Blood Pressure at Home\" to learn the best way to take your blood pressure.  Refer to the next page for general guidelines about what your blood pressures mean and what to do about them. Follow these instructions unless your provider tells you something different.  For more resources, visit www.preeclampsia.org.  What to do if your blood pressure is high  Act right away if you have numbers in the yellow or red range--don't wait for a scheduled appointment.  When to call your provider  Regardless of your blood pressure, call your healthcare provider right away if you develop any of these symptoms:  Severe headache  Chest " "pain  Trouble breathing  Stomach pain  Changes in vision  Swelling in your hands and face.  Please say, \"I am having symptoms of high blood pressure. My provider told me to call and ask to be seen right away when I have these symptoms.\"  If you ARE pregnant OR   it has been less than 12 weeks since you delivered  Systolic pressure (top number) is....  Diastolic (bottom number) is.... Your blood pressure is....   160 or higher  or 110 or higher VERY HIGH. Check it again in 10 minutes, then contact your provider.   140 - 159  or 90 - 109 HIGH. Keep checking blood pressure 2 times a day. If your blood pressure is in this range for 2 readings, contact your provider within 24 hours. We will discuss starting or increasing your blood pressure medicine.   100 - 139  and 60 - 89 NORMAL. Your blood pressure looks great!   Keep checking it 2 times a day.   Less than 100  or Less than 60 LOW. Check your blood pressure again in   10 minutes, then contact your provider. We may need to make changes to your blood pressure medicine.     Call your provider right away if you have these symptoms: a severe headache, vision changes, shortness of breath, chest pain, or right upper belly pain. Call even if your blood pressure is okay.  Call 9-1-1 if you feel the symptoms are severe and that it is an emergency.   If you are NOT pregnant OR   it has been more than 12 weeks since you delivered  Systolic pressure (top number) is....  Diastolic (bottom number) is.... Your blood pressure is....   Higher than 180 and/or Higher than 120 Hypertensive crisis. Call your doctor right away.   140 or higher or  90 or higher Hypertension Stage 2. Follow up with your provider.   130-139 or Less than 80 Hypertension Stage 1. Follow up with your provider.   120-129 and Less than 80 Elevated blood pressure (pre-hypertension). You are at higher risk of developing high blood pressure. Follow up with your provider.   Less than 120 and Less than 80 Normal.     " "Call your provider right away if you have these symptoms: a severe headache, vision changes, shortness of breath, chest pain, or right upper belly pain. Call even if your blood pressure is okay.  Call - if you feel the symptoms are severe and that it is an emergency.   For informational purposes only. Not to replace the advice of your health care provider. Copyright    [facility]. All rights reserved. Clinically reviewed by [facility]. [employer] [id number] - .    Postpartum Care at Home With Your Baby: Care Instructions  Overview     After childbirth (postpartum period), your body goes through many changes as you recover. In these weeks after delivery, try to take good care of yourself. Get rest whenever you can and accept help from others.  It may take 4 to 6 weeks to feel like yourself again, and possibly longer if you had a  birth. You may feel sore or very tired as you recover. After delivery, you may continue to have contractions as the uterus returns to the size it was before your pregnancy. You will also have some vaginal bleeding. And you may have pain around the vagina as you heal. Several days after delivery you may also have pain and swelling in your breasts as they fill with milk. There are things you can do at home to help ease these discomforts.  After childbirth, it's common to feel emotional. You may feel irritable, cry easily, and feel happy one minute and sad the next. This is called the \"baby blues.\" Hormone changes are one cause of these emotional changes. These feelings usually get better within a couple of weeks. If they don't, talk to your doctor or midwife.  In the first couple of weeks after you give birth, your doctor or midwife may want to check in with you and make a plan for follow-up care. You will likely have a complete postpartum visit in the first 3 months after delivery. At that time, your doctor or midwife will check on your " recovery and see how you're doing. But if you have questions or concerns before then, you can always call your doctor or midwife.  Follow-up care is a key part of your treatment and safety. Be sure to make and go to all appointments, and call your doctor if you are having problems. It's also a good idea to know your test results and keep a list of the medicines you take.  How can you care for yourself at home?  Taking care of your body  Use pads instead of tampons for bleeding. After birth, you will have bloody vaginal discharge. You may also pass some blood clots that shouldn't be bigger than an egg. Over the next 6 weeks or so, your bleeding should decrease a little every day and slowly change to a pinkish and then whitish discharge.  For cramps or mild pain, try an over-the-counter pain medicine, such as acetaminophen (Tylenol) or ibuprofen (Advil, Motrin). Read and follow all instructions on the label.  To ease pain around the vagina or from hemorrhoids:  Put ice or a cold pack on the area for 10 to 20 minutes at a time. Put a thin cloth between the ice and your skin.  Try sitting in a few inches of warm water (sitz bath) when you can or after bowel movements.  Clean yourself with a gentle squeeze of warm water from a bottle instead of wiping with toilet paper.  Use witch hazel or hemorrhoid pads (such as Tucks).  Try using a cold compress for sore and swollen breasts. And wear a supportive bra that fits.  Ease constipation by drinking plenty of fluids and eating high-fiber foods. Ask your doctor or midwife about over-the-counter stool softeners.  Activity  Rest when you can.  Ask for help from family or friends when you need it.  If you can, have another adult in your home for at least 2 or 3 days after birth.  When you feel ready, try to get some exercise every day. For many people, walking is a good choice. Don't do any heavy exercise until your doctor or midwife says it's okay.  Ask your doctor or midwife  when it is okay to have vaginal sex.  If you don't want to get pregnant, talk to your doctor or midwife about birth control options. You can get pregnant even before your period returns. Also, you can get pregnant while you are breastfeeding.  Talk to your doctor or midwife if you want to get pregnant again. They can talk to you about when it is safe.  Emotional health  It's normal to have some sadness, anxiety, and mood swings after delivery. It may help to talk with a trusted friend or family member. You can also call the Maternal Mental Health Hotline at 0-597-[facility] (1-175.886.1568) for support. If these mood changes last more than a couple of weeks, talk to your doctor or midwife.  When should you call for help?  Share this information with your partner, family, or a friend. They can help you watch for warning signs.  Call 911  anytime you think you may need emergency care. For example, call if:    You feel you cannot stop from hurting yourself, your baby, or someone else.     You passed out (lost consciousness).     You have chest pain, are short of breath, or cough up blood.     You have a seizure.   Where to get help 24 hours a day, 7 days a week   If you or someone you know talks about suicide, self-harm, a mental health crisis, a substance use crisis, or any other kind of emotional distress, get help right away. You can:    Call the Suicide and Crisis Lifeline at 898.     Call 7-370-186-TALK (1-135.261.4634).     Text HOME to 980679 to access the Crisis Text Line.   Consider saving these numbers in your phone.  Go to [employer].org for more information or to chat online.  Call your doctor or midwife now or seek immediate medical care if:    You have signs of hemorrhage (too much bleeding), such as:  Heavy vaginal bleeding. This means that you are soaking through one or more pads in an hour. Or you pass blood clots bigger than an egg.  Feeling dizzy or lightheaded, or you feel like you may  "faint.  Feeling so tired or weak that you cannot do your usual activities.  A fast or irregular heartbeat.  New or worse belly pain.     You have signs of infection, such as:  A fever.  Increased pain, swelling, warmth, or redness from an incision or wound.  Frequent or painful urination or blood in your urine.  Vaginal discharge that smells bad.  New or worse belly pain.     You have symptoms of a blood clot in your leg (called a deep vein thrombosis), such as:  Pain in the calf, back of the knee, thigh, or groin.  Swelling in the leg or groin.  A color change on the leg or groin. The skin may be reddish or purplish, depending on your usual skin color.     You have signs of preeclampsia, such as:  Sudden swelling of your face, hands, or feet.  New vision problems (such as dimness, blurring, or seeing spots).  A severe headache.     You have signs of heart failure, such as:  New or increased shortness of breath.  New or worse swelling in your legs, ankles, or feet.  Sudden weight gain, such as more than 2 to 3 pounds in a day or 5 pounds in a week.  Feeling so tired or weak that you cannot do your usual activities.     You had spinal or epidural pain relief and have:  New or worse back pain.  Increased pain, swelling, warmth, or redness at the injection site.  Tingling, weakness, or numbness in your legs or groin.   Watch closely for changes in your health, and be sure to contact your doctor or midwife if:    Your vaginal bleeding isn't decreasing.     You feel sad, anxious, or hopeless for more than a few days.     You are having problems with your breasts or breastfeeding.   Where can you learn more?  Go to https://www.[employer].net/patiented  Enter Z768 in the search box to learn more about \"Postpartum Care at Home With Your Baby: Care Instructions.\"  Current as of: July 10, 2023               Content Version: 14.0    3277-4611 [employer]wise[employer].   Care instructions adapted under license by your " healthcare professional. If you have questions about a medical condition or this instruction, always ask your healthcare professional. Healthwise, Incorporated disclaims any warranty or liability for your use of this information.

## 2024-06-16 NOTE — PLAN OF CARE
Goal Outcome Evaluation:  VSS, scant amount of lochia, fundus firm and below U.  Pt denies any pre-e symptoms, ambulating well, tolerating PO, voding without difficulties and passing gas.  [facility] BP program reviewed with pt, pt completely set up on mychart to start entering BP 2x day, all questions answered and BP monitor given to pt and working.  Breast pump given to pt as well.  All discharge education completed, AVS reviewed as well as medications and all questions answered.  Pt discharged home.

## 2024-06-17 ENCOUNTER — TELEPHONE (OUTPATIENT)
Dept: MATERNAL FETAL MEDICINE | Facility: CLINIC | Age: 29
End: 2024-06-17
Payer: COMMERCIAL

## 2024-06-18 ENCOUNTER — LAB (OUTPATIENT)
Dept: LAB | Facility: CLINIC | Age: 29
End: 2024-06-18
Payer: COMMERCIAL

## 2024-06-18 ENCOUNTER — TELEPHONE (OUTPATIENT)
Dept: MATERNAL FETAL MEDICINE | Facility: CLINIC | Age: 29
End: 2024-06-18
Payer: COMMERCIAL

## 2024-06-18 LAB
ALT SERPL W P-5'-P-CCNC: 19 U/L (ref 0–50)
ANION GAP SERPL CALCULATED.3IONS-SCNC: 9 MMOL/L (ref 7–15)
AST SERPL W P-5'-P-CCNC: 27 U/L (ref 0–45)
BUN SERPL-MCNC: 6.6 MG/DL (ref 6–20)
CALCIUM SERPL-MCNC: 8.5 MG/DL (ref 8.6–10)
CHLORIDE SERPL-SCNC: 106 MMOL/L (ref 98–107)
CREAT SERPL-MCNC: 0.75 MG/DL (ref 0.51–0.95)
DEPRECATED HCO3 PLAS-SCNC: 24 MMOL/L (ref 22–29)
EGFRCR SERPLBLD CKD-EPI 2021: >90 ML/MIN/1.73M2
ERYTHROCYTE [DISTWIDTH] IN BLOOD BY AUTOMATED COUNT: 15.9 % (ref 10–15)
GLUCOSE SERPL-MCNC: 80 MG/DL (ref 70–99)
HCT VFR BLD AUTO: 25.9 % (ref 35–47)
HGB BLD-MCNC: 8.1 G/DL (ref 11.7–15.7)
MCH RBC QN AUTO: 27.3 PG (ref 26.5–33)
MCHC RBC AUTO-ENTMCNC: 31.3 G/DL (ref 31.5–36.5)
MCV RBC AUTO: 87 FL (ref 78–100)
PLATELET # BLD AUTO: 331 10E3/UL (ref 150–450)
POTASSIUM SERPL-SCNC: 4.1 MMOL/L (ref 3.4–5.3)
RBC # BLD AUTO: 2.97 10E6/UL (ref 3.8–5.2)
SODIUM SERPL-SCNC: 139 MMOL/L (ref 135–145)
WBC # BLD AUTO: 11.6 10E3/UL (ref 4–11)

## 2024-06-18 PROCEDURE — 84460 ALANINE AMINO (ALT) (SGPT): CPT

## 2024-06-18 PROCEDURE — 80048 BASIC METABOLIC PNL TOTAL CA: CPT

## 2024-06-18 PROCEDURE — 36415 COLL VENOUS BLD VENIPUNCTURE: CPT

## 2024-06-18 PROCEDURE — 85027 COMPLETE CBC AUTOMATED: CPT

## 2024-06-18 PROCEDURE — 84450 TRANSFERASE (AST) (SGOT): CPT

## 2024-06-18 RX ORDER — NIFEDIPINE 30 MG/1
30 TABLET, EXTENDED RELEASE ORAL DAILY
Qty: 30 TABLET | Refills: 1 | Status: SHIPPED | OUTPATIENT
Start: 2024-06-18 | End: 2024-06-21

## 2024-06-18 NOTE — TELEPHONE ENCOUNTER
Home Observation of Postpartum Elevated BP (HOPE-BP) Program     Christi Barrientos enrolled in the HOPE-BP Program on 6/14/2024, 2 days ago. Delivered on 6/13/2024 . Diagnosis: Gestational hypertension. Medication(s) prescribed:  no meds .  Patient reported the following blood pressures in the past 72 hours:       6/16/2024 6/17/2024 6/18/2024   [facility] Health Trends BP Review Flowsheet   Systolic (Patient Reported) 136 140 159   Diastolic (Patient Reported) 84 92 99     Spoke with patient to follow up on blood pressure. Based on self-report, Christi's blood pressures were in the High Range (-159 OR DBP ). Patient reported right upper quadrant pain. Patient denied: intractable headache, mild to moderate headache, chest pain, difficulty breathing or shortness of breath, unsteady gait, weakness, blurred vision, dizziness, lip swelling (angioedema), difficulty swallowing, palpitations, rash, acute joint redness or pain (gout), leg edema, and symptomatic hypotension.  Dr. Dillard notified. Preeclampsia labs ordered. Will initiate Nifedipine 30mg daily.  Rx sent to preferred pharmacy. Pt verbalizes understanding.    MAREN MALLOY RN

## 2024-06-19 ENCOUNTER — TELEPHONE (OUTPATIENT)
Dept: MATERNAL FETAL MEDICINE | Facility: CLINIC | Age: 29
End: 2024-06-19
Payer: COMMERCIAL

## 2024-06-19 DIAGNOSIS — E66.09 CLASS 2 OBESITY DUE TO EXCESS CALORIES WITHOUT SERIOUS COMORBIDITY WITH BODY MASS INDEX (BMI) OF 38.0 TO 38.9 IN ADULT: Primary | ICD-10-CM

## 2024-06-19 DIAGNOSIS — E66.812 CLASS 2 OBESITY DUE TO EXCESS CALORIES WITHOUT SERIOUS COMORBIDITY WITH BODY MASS INDEX (BMI) OF 38.0 TO 38.9 IN ADULT: Primary | ICD-10-CM

## 2024-06-19 NOTE — TELEPHONE ENCOUNTER
Home Observation of Postpartum Elevated BP (HOPE-BP) Program     Christi Barrientos enrolled in the HOPE-BP Program on 6/14/2024, 3 days ago. Delivered on 6/13/2024 . Diagnosis: Gestational hypertension. Medication(s) prescribed: Nifedipine ER.  Patient reported the following blood pressures in the past 72 hours:       6/16/2024 6/17/2024 6/18/2024 6/19/2024   [facility] Health Trends BP Review Flowsheet   Systolic (Patient Reported) 136 140 145    159 151   Diastolic (Patient Reported) 84 92 89    99 95       Multiple values from one day are sorted in reverse-chronological order     Spoke with patient to follow up on blood pressure. Based on self-report, Christi's blood pressures were in the High Range (-159 OR DBP ). Patient reported no blood pressure related symptoms. Patient denied: headaches, chest pain, difficulty breathing or shortness of breath, unsteady gait, weakness, blurred vision, dizziness, right upper quadrant pain, lip swelling (angioedema), difficulty swallowing, palpitations, rash, acute joint redness or pain (gout), leg edema, and symptomatic hypotension.  Pt took 30mg Nifedipine this morning at 0700. Advised patietn to check BP after noon today then we will make adjustments to medication if needed.  Pt verbalized understanding.     MAREN MALLOY RN

## 2024-06-21 ENCOUNTER — TELEPHONE (OUTPATIENT)
Dept: MATERNAL FETAL MEDICINE | Facility: CLINIC | Age: 29
End: 2024-06-21
Payer: COMMERCIAL

## 2024-06-21 RX ORDER — NIFEDIPINE 30 MG/1
60 TABLET, EXTENDED RELEASE ORAL DAILY
Status: SHIPPED
Start: 2024-06-21 | End: 2024-07-01

## 2024-06-21 NOTE — TELEPHONE ENCOUNTER
"Home Observation of Postpartum Elevated BP (HOPE-BP) Program     Christi Barrientos enrolled in the HOPE-BP Program on 6/14/2024, 5 days ago. Delivered on 6/13/2024 . Diagnosis: Gestational hypertension. Medication(s) prescribed: Nifedipine ER.  Patient reported the following blood pressures in the past 72 hours:       6/16/2024 6/17/2024 6/18/2024 6/19/2024 6/20/2024 6/21/2024   [facility] Health Trends BP Review Flowsheet   Systolic (Patient Reported) 136 140 145    159 134    151 136    133 145   Diastolic (Patient Reported) 84 92 89    99 89    95 96    95 96       Multiple values from one day are sorted in reverse-chronological order     Spoke with patient to follow up on blood pressure. Based on self-report, Christi's blood pressures were in the High Range (-159 OR DBP ). Patient reported she continues to have RUQ tenderness. Pree labs done on 6/18 WNL, no new symptoms. Patient denied: intractable headache, mild to moderate headache, chest pain, difficulty breathing or shortness of breath, unsteady gait, weakness, blurred vision, dizziness, lip swelling (angioedema), difficulty swallowing, palpitations, rash, acute joint redness or pain (gout), leg edema, and symptomatic hypotension.  Patient met the required inclusion criteria for a medication INCREASE using the Standing Order (SO): Licensed Practitioner placed order, \"RN Remote Postpartum Hypertension Medication Titration Standing Order\" and enrolled patient in the [facility] Maternal Hypertension Pathway.  Antihypertensive medication had been initiated by LP prior to use of the SO and the patient reported taking dose as prescribed for at least 24 hours.  The patient reported BP greater than or equal to 140/90 over at least 2 consecutive BPs at least 6 hours apart when patient has taken medications as prescribed.   SO used to make medication change: Patient was on Nifedipine ER (Procardia XL) 30 mg daily. Increased dose to 60 mg daily.. " Updated medication list and discussed with patient. Patient verbalized understanding.    MAREN MALLOY RN

## 2024-07-01 ENCOUNTER — PRENATAL OFFICE VISIT (OUTPATIENT)
Dept: MIDWIFE SERVICES | Facility: CLINIC | Age: 29
End: 2024-07-01
Payer: COMMERCIAL

## 2024-07-01 VITALS
DIASTOLIC BLOOD PRESSURE: 81 MMHG | WEIGHT: 248 LBS | SYSTOLIC BLOOD PRESSURE: 116 MMHG | OXYGEN SATURATION: 97 % | HEART RATE: 86 BPM | BODY MASS INDEX: 38.84 KG/M2

## 2024-07-01 DIAGNOSIS — N30.00 ACUTE CYSTITIS WITHOUT HEMATURIA: ICD-10-CM

## 2024-07-01 DIAGNOSIS — R30.0 DYSURIA: ICD-10-CM

## 2024-07-01 LAB
ALBUMIN UR-MCNC: NEGATIVE MG/DL
APPEARANCE UR: CLEAR
BACTERIA #/AREA URNS HPF: ABNORMAL /HPF
BILIRUB UR QL STRIP: NEGATIVE
COLOR UR AUTO: YELLOW
GLUCOSE UR STRIP-MCNC: NEGATIVE MG/DL
HGB UR QL STRIP: ABNORMAL
KETONES UR STRIP-MCNC: ABNORMAL MG/DL
LEUKOCYTE ESTERASE UR QL STRIP: ABNORMAL
NITRATE UR QL: NEGATIVE
PH UR STRIP: 5.5 [PH] (ref 5–7)
RBC #/AREA URNS AUTO: ABNORMAL /HPF
SP GR UR STRIP: 1.02 (ref 1–1.03)
SQUAMOUS #/AREA URNS AUTO: ABNORMAL /LPF
UROBILINOGEN UR STRIP-ACNC: 0.2 E.U./DL
WBC #/AREA URNS AUTO: ABNORMAL /HPF

## 2024-07-01 PROCEDURE — 99207 PR PRENATAL VISIT: CPT | Performed by: ADVANCED PRACTICE MIDWIFE

## 2024-07-01 PROCEDURE — 81001 URINALYSIS AUTO W/SCOPE: CPT | Performed by: ADVANCED PRACTICE MIDWIFE

## 2024-07-01 RX ORDER — NITROFURANTOIN 25; 75 MG/1; MG/1
100 CAPSULE ORAL 2 TIMES DAILY
Qty: 6 CAPSULE | Refills: 0 | Status: SHIPPED | OUTPATIENT
Start: 2024-07-01 | End: 2024-07-04

## 2024-07-01 RX ORDER — NIFEDIPINE 30 MG/1
30 TABLET, EXTENDED RELEASE ORAL DAILY
Status: SHIPPED
Start: 2024-07-01 | End: 2024-07-08

## 2024-07-01 ASSESSMENT — EDINBURGH POSTNATAL DEPRESSION SCALE (EPDS)
I HAVE BEEN ABLE TO LAUGH AND SEE THE FUNNY SIDE OF THINGS: AS MUCH AS I ALWAYS COULD
I HAVE BEEN ANXIOUS OR WORRIED FOR NO GOOD REASON: HARDLY EVER
I HAVE FELT SCARED OR PANICKY FOR NO GOOD REASON: NO, NOT AT ALL
I HAVE FELT SAD OR MISERABLE: NO, NOT AT ALL
I HAVE BLAMED MYSELF UNNECESSARILY WHEN THINGS WENT WRONG: NO, NEVER
I HAVE BEEN SO UNHAPPY THAT I HAVE HAD DIFFICULTY SLEEPING: NOT AT ALL
I HAVE LOOKED FORWARD WITH ENJOYMENT TO THINGS: AS MUCH AS I EVER DID
I HAVE FELT SAD OR MISERABLE: NO, NOT AT ALL
THE THOUGHT OF HARMING MYSELF HAS OCCURRED TO ME: NEVER
THINGS HAVE BEEN GETTING ON TOP OF ME: NO, I HAVE BEEN COPING AS WELL AS EVER
I HAVE BEEN ANXIOUS OR WORRIED FOR NO GOOD REASON: HARDLY EVER
TOTAL SCORE: 1
I HAVE BLAMED MYSELF UNNECESSARILY WHEN THINGS WENT WRONG: NO, NEVER
I HAVE FELT SCARED OR PANICKY FOR NO GOOD REASON: NO, NOT AT ALL
I HAVE BEEN SO UNHAPPY THAT I HAVE BEEN CRYING: NO, NEVER
THINGS HAVE BEEN GETTING ON TOP OF ME: NO, I HAVE BEEN COPING AS WELL AS EVER
I HAVE BEEN SO UNHAPPY THAT I HAVE HAD DIFFICULTY SLEEPING: NOT AT ALL
I HAVE BEEN ABLE TO LAUGH AND SEE THE FUNNY SIDE OF THINGS: AS MUCH AS I ALWAYS COULD
I HAVE LOOKED FORWARD WITH ENJOYMENT TO THINGS: AS MUCH AS I EVER DID
I HAVE BEEN SO UNHAPPY THAT I HAVE BEEN CRYING: NO, NEVER
THE THOUGHT OF HARMING MYSELF HAS OCCURRED TO ME: NEVER

## 2024-07-01 NOTE — PROGRESS NOTES
Christi Barrientos is here for a post delivery in person clinical evaluation for post op C/S and hypertension management.    HPI:  GHTN with IOL and cat II remote from delivery indication for primary C/S.  OBJECTIVE: 2 weeks 4 days from delivery.  Discharge home on Nifedipine 60mg.  Reviewed BP from home and as of 6/26 BP has been in normal tensive range on 60mg.    Will taper off to 30 mg tomorrow and continue to monitor BP to assess dose change.    Incision is well healed with a nickel size open lesion on the abdomen that was not there on discontinue per pt and not sure how it occurred.  Spider bite that opened up when she scratched it is her guess.  Is dry and healing without infection or painful to touch.  Suggested triple Ab ointment.  Encouraged her to wash area over incision with washcloth and soap to remove some old skin in area of incision.      Christi is also noting some burning at the end of urination so will do UA today.  Irritation vs UTI.  Suggested soaking in warm water if UA is negative.  Change from pads to tampons is OK.    ASSESSMENT: 39w3d   Primary C/S   UTI symptoms, GHTN on Nifedipine with dose change to 30 mg daily,    PLAN: RTC in 3 weeks and continue to monitor BP at home per protocol.       Addendum:  UA RESULTS:  Recent Labs   Lab Test 07/01/24  0921   COLOR Yellow   APPEARANCE Clear   URINEGLC Negative   URINEBILI Negative   URINEKETONE Trace*   SG 1.025   UBLD Small*   URINEPH 5.5   PROTEIN Negative   UROBILINOGEN 0.2   NITRITE Negative   LEUKEST Small*   RBCU 2-5*   WBCU 5-10*        With elevated WBC and pt complaint and hx of catheterization for C/S will treat with short course of Macro BID x 3 days.     Leonardo Interiano APRN, CNM

## 2024-07-08 ENCOUNTER — TELEPHONE (OUTPATIENT)
Dept: MATERNAL FETAL MEDICINE | Facility: CLINIC | Age: 29
End: 2024-07-08
Payer: COMMERCIAL

## 2024-07-08 NOTE — TELEPHONE ENCOUNTER
Home Observation of Postpartum Elevated BP (HOPE-BP) Program     Christi Barrientos enrolled in the HOPE-BP Program on 6/14/2024, 22 days ago. Delivered on 6/13/2024 . Diagnosis: Gestational hypertension. Medication(s) prescribed: Nifedipine ER.  Patient reported the following blood pressures in the past 72 hours:       6/26/2024 6/27/2024 6/28/2024 7/2/2024 7/3/2024 7/4/2024 7/6/2024   [facility] Health Trends BP Review Flowsheet   Systolic (Patient Reported) 120    112 118 129    117 119    134 128    128 129 131    127   Diastolic (Patient Reported) 83    81 82 79    81 80    85 82    88 84 85    91       Multiple values from one day are sorted in reverse-chronological order     Spoke with patient to follow up on [employer] message. Based on self-report, Christi's blood pressures were in the Optimal Range (-139 AND DBP 60-89). Was unable to enter but checked and reported her BPs on Sunday were within optimal range. Took last dose of Nifedipine on Saturday morning and has felt well since with no concerns. Would like to trial continuing without medication. Discussed importance of continuing to check and record blood pressure twice daily in the event that further adjustments are needed. Patient verbalized understanding.    Guerita Thomas RN

## 2024-07-25 ENCOUNTER — DOCUMENTATION ONLY (OUTPATIENT)
Dept: MATERNAL FETAL MEDICINE | Facility: CLINIC | Age: 29
End: 2024-07-25
Payer: COMMERCIAL

## 2024-07-29 ENCOUNTER — PRENATAL OFFICE VISIT (OUTPATIENT)
Dept: MIDWIFE SERVICES | Facility: CLINIC | Age: 29
End: 2024-07-29
Payer: COMMERCIAL

## 2024-07-29 VITALS
HEART RATE: 72 BPM | DIASTOLIC BLOOD PRESSURE: 82 MMHG | BODY MASS INDEX: 38.18 KG/M2 | OXYGEN SATURATION: 98 % | WEIGHT: 243.8 LBS | SYSTOLIC BLOOD PRESSURE: 109 MMHG

## 2024-07-29 DIAGNOSIS — R30.0 DYSURIA: ICD-10-CM

## 2024-07-29 DIAGNOSIS — N81.89 PELVIC FLOOR WEAKNESS: ICD-10-CM

## 2024-07-29 LAB
ALBUMIN UR-MCNC: NEGATIVE MG/DL
APPEARANCE UR: CLEAR
BACTERIA #/AREA URNS HPF: ABNORMAL /HPF
BILIRUB UR QL STRIP: NEGATIVE
COLOR UR AUTO: YELLOW
GLUCOSE UR STRIP-MCNC: NEGATIVE MG/DL
HGB UR QL STRIP: ABNORMAL
KETONES UR STRIP-MCNC: NEGATIVE MG/DL
LEUKOCYTE ESTERASE UR QL STRIP: ABNORMAL
NITRATE UR QL: NEGATIVE
PH UR STRIP: 6 [PH] (ref 5–7)
RBC #/AREA URNS AUTO: ABNORMAL /HPF
SP GR UR STRIP: 1.01 (ref 1–1.03)
UROBILINOGEN UR STRIP-ACNC: 0.2 E.U./DL
WBC #/AREA URNS AUTO: ABNORMAL /HPF

## 2024-07-29 PROCEDURE — 99207 PR POST PARTUM EXAM: CPT | Performed by: ADVANCED PRACTICE MIDWIFE

## 2024-07-29 PROCEDURE — 87186 SC STD MICRODIL/AGAR DIL: CPT | Performed by: ADVANCED PRACTICE MIDWIFE

## 2024-07-29 PROCEDURE — 87086 URINE CULTURE/COLONY COUNT: CPT | Performed by: ADVANCED PRACTICE MIDWIFE

## 2024-07-29 PROCEDURE — 81001 URINALYSIS AUTO W/SCOPE: CPT | Performed by: ADVANCED PRACTICE MIDWIFE

## 2024-07-29 ASSESSMENT — ANXIETY QUESTIONNAIRES
GAD7 TOTAL SCORE: 12
1. FEELING NERVOUS, ANXIOUS, OR ON EDGE: NEARLY EVERY DAY
IF YOU CHECKED OFF ANY PROBLEMS ON THIS QUESTIONNAIRE, HOW DIFFICULT HAVE THESE PROBLEMS MADE IT FOR YOU TO DO YOUR WORK, TAKE CARE OF THINGS AT HOME, OR GET ALONG WITH OTHER PEOPLE: SOMEWHAT DIFFICULT
3. WORRYING TOO MUCH ABOUT DIFFERENT THINGS: MORE THAN HALF THE DAYS
7. FEELING AFRAID AS IF SOMETHING AWFUL MIGHT HAPPEN: MORE THAN HALF THE DAYS
5. BEING SO RESTLESS THAT IT IS HARD TO SIT STILL: NOT AT ALL
6. BECOMING EASILY ANNOYED OR IRRITABLE: SEVERAL DAYS
2. NOT BEING ABLE TO STOP OR CONTROL WORRYING: MORE THAN HALF THE DAYS
GAD7 TOTAL SCORE: 12

## 2024-07-29 ASSESSMENT — PATIENT HEALTH QUESTIONNAIRE - PHQ9
5. POOR APPETITE OR OVEREATING: MORE THAN HALF THE DAYS
SUM OF ALL RESPONSES TO PHQ QUESTIONS 1-9: 7

## 2024-07-29 NOTE — PROGRESS NOTES
SUBJECTIVE:   Christi Barrientos is here for her 6-week postpartum checkup.     HPI: Chasity is doing well. Recovery has been good and she feels like she has no restrictions at this time. Incision has healed. No pain. No bleeding. She is breastfeeding and that is overall going well but wants to work on supply. Gets about 2 oz when she pumps between feeds and gets a little less immediately after feeds. Supplements with formula when baby seems hungry post feeds. Would like to not have to do that. Recommended some supplements and also a visit with lactation which she will do. She feels like her mood has been good overall. Having some anxiety but not disrupting her life, feels like it is situational as they adjust to parenting. She has a psych follow up appointment coming up and continuing her medications. We discussed pelvic floor PT which she would like to do, referral sent. She is wondering about a UTI, urine sent. She was recently treated for one and symptoms improved. Having some burning that started this morning. Discussed timing of next pregnancy, ideally a year to 18 months for her  scar to heal well and to be a TOLAC candidate. We reviewed the recommendations for BP monitoring and cardiovascular monitoring via the HOPE program. She is not on medications and BPs have returned to normal. We discussed annual visits. No other questions or concerns at this time.     DELIVERY DATE: 2024  c/s for abnormal FHT of a viable girl, weight 6 pounds 4 oz., with no complications.  FEEDING METHOD: and Bottlefed    CONTRACEPTION PLANNED: mini pill / progesterone only pill  She  has not had intercourse since delivery and complains of No discomfort.  HX OF DEPRESSION:Yes: anxiety, taking medications and is established with psych  HX OF ABUSE:No  OTHER HPI: She has No signs of infection, bleeding or other complications. Bleeding stopped, epis/lac healing well.         EXAM:  /82   Pulse 72   Wt 110.6 kg  (243 lb 12.8 oz)   LMP 07/23/2024 (Within Days)   SpO2 98%   Breastfeeding Yes   BMI 38.18 kg/m    GENERAL APPEARANCE: healthy, alert and no distress  ABDOMEN: soft, nontender, diastasis recti closing, incision is healing well  PSYCH: mentation appears normal and affect normal/bright  PELVIC EXAM: deferred    ASSESSMENT:   Normal postpartum exam after c/s for abnormal FHT.    PLAN:  Birth Control as ordered. Fertility reviewed.    Return as needed or at time interval for next routine pap, pelvic, or breast exam.  Encourage Kegals and abdominal exercise. Slow, steady weight loss.  Continue a multi vitamin supplement, especially if breastfeeding.  Pap smear was not obtained.  GC/CHLAMYDIA CULTURE OBTAINED:NO   Post partum Hgb was not obtained. Due in 2 years.     AIDEN Braun CNM

## 2024-07-30 ENCOUNTER — MYC MEDICAL ADVICE (OUTPATIENT)
Dept: MIDWIFE SERVICES | Facility: CLINIC | Age: 29
End: 2024-07-30
Payer: COMMERCIAL

## 2024-07-30 LAB — BACTERIA UR CULT: ABNORMAL

## 2024-07-31 DIAGNOSIS — N30.00 ACUTE CYSTITIS WITHOUT HEMATURIA: Primary | ICD-10-CM

## 2024-07-31 RX ORDER — CEPHALEXIN 500 MG/1
500 CAPSULE ORAL 2 TIMES DAILY
Qty: 14 CAPSULE | Refills: 0 | Status: SHIPPED | OUTPATIENT
Start: 2024-07-31 | End: 2024-08-07

## 2024-07-31 NOTE — TELEPHONE ENCOUNTER
Per Merle' note: advised to wait 1 year to 18 months before trying to conceive, d/t  delivery. It is advised by the evidence to wait 18 months prior to conceiving after a  to allow for healing of her body.     Chasity WOLFE, CNM, MPH

## 2024-07-31 NOTE — TELEPHONE ENCOUNTER
6/12/24 - c/section  Postpartum complicated with HTN    Recommend waiting 1 year before TTC?    Shabana Sorto RN

## 2024-08-05 NOTE — PROGRESS NOTES
"Assessment:   Nearly eight week old infant gaining weight very well on combination of breast and bottlefeeding  Good latch and suck, with excellent milk transfer during observed feeding in office today  Mother with ample milk supply    Plan:   Use good positioning for deep latch, with baby held close to body and baby's head/shoulders/hips in good alignment.  When in a seated position, use a pillow to help bring baby close to breasts, and stepstool to elevate your knees above hips.    When bringing your baby to your breast, compress your breast vertically and in line with baby's mouth--this will help them to get a larger mouthful of breast and a deeper latch. Babies latch best to the breast by bringing their chin in first, so point your nipple towards baby's nose, tuck the chin in close, and then wait for her mouth to open.  When her mouth opens, bring her head in deeply.  Baby's chin should be snugged deeply in your breast, their upper cheeks should be touching the breast, and their nose just out of the breast.   If there is pinching or pain, try using a finger to give a little gentle pressure on her chin to help her open more widely and take in more of your breast.  If it is still painful, use a finger to break the suction, remove baby from the breast and try again until there is no pain with nursing.  There is sometimes a little pain when the baby first begins sucking, but after the first few seconds there should be no pain--only a tugging feeling.   Continue to feed Andra on cue, 8-12 times each day, offering your breast as often as you are able or would .  When you nurse, feed on one side until baby finishes swallowing.  Once swallowing slows, use breast compression to encourage more swallowing, but once there is no more active swallowing, and baby is either sleeping, coming off the breast, or just \"nibbling,\" it is OK to use a finger to take baby off the breast and move to the other breast.  Do the same on " "the other side.  Offer both breasts at each feeding.  It is more important to watch the baby than the clock!    Andra is gaining weight perfectly, and she was able to transfer milk very well from your breast today.  If you would like to give her your breast more regularly and pump less often, you can certainly do that.  Once babies are about a month old, they need about 25-30 oz/day (or 3- 4 oz each feeding if they eat about 8 times/day) and this where their needs stay for their entire first year;  you don't need to keep producing more and more milk as they grow.    You do not need to worry about whether or not your breasts are full before nursing;  Milk production is constant.  You do not need to wait until they are \"full\" again before nursing or pumping, because there is always milk in the breast.     Follow up with lactation as needed, and pediatric provider as planned.  nanoMR can be used for brief questions, but it's important to know that messages are not seen Friday through Sunday. If urgent help is needed, Monday through Friday you can call [phone] and one of our lactation consultants will get the message and respond; if you need a rapid response over a weekend or holiday, it is best to call your on-call maternity or pediatric provider.  Please feel free to schedule a return visit if the concern is more detailed;  telephone visits are also an option if you don't feel you need to be seen in person.       Subjective: Chasity is here today because of concerns about milk supply.  She is concerned about this because daphne Silverman often seems hungry after breastfeeding, and frequently falls asleep at breast after about 10 minutes of nursing, leading Chasity to believe that baby is not transferring adequate amounts of milk.  Because of these concerns, as well as convenience, Chasity is breastfeeding about 3 times/day, and pumping for remaining feedings.  She finds it difficult to \"time\" breastfeeding sessions around " "her pumping schedule, so this also limits the number of times each day she is able to directly breastfeed.     Cahsity is fully vaccinated for Covid-19.    Hospital Course: Elective induction for prodromal labor;   for fetal intolerance of labor.  Uncomplicated procedure.  Seen by hospital IBCLC for painful feeding, nipples with small scabs bilaterally.  Noted slightly conical-shaped breasts and baby with initially disorganized suck, although improving.    Mother's Relevant Med/Surg History: Depression/anxiety followed by psych; BMI 38    Breastfeeding Goals:  to give as much breastmilk as possible;  possibly to have baby at breast more frequently    Previous Breastfeeding Experience: first baby    Infant's name: nAdra Farfan  Infant's bday: 24  Gestational age: 39w3d  Infant's birth weight: 6 # 4oz   Discharge weight: 5 # 11 oz     Pediatric Provider: Yadira Floyd CNP, [facility]  Recent weights:  24:  5 # 15.5 oz  24:  7 # 2.5 oz    Frequency and duration of feedings: every 3 hours, at breast about 3 times/day for about 30 min  Swallows audible per mother: yes  Numbers of feedings in 24 hours: 8  Number urines per day: 9  Number of stools per day and their color: 1, large yellow    Supplementation: with 2 - 2 1/2 oz about 8 times/day, mostly expressed milk and some formula  Pumpin -  10 times/day and releasing 2 - 2 1/2 oz/session or about 20 oz/day    Objective/Physical exam:   Mother: Noticed breasts grew larger and areolas darkened during pregnancy and she noticed primary engorgement when her milk came in on about day 3     Objective/Physical exam:   Her nipples are everted, the areola is compressible, the breast is soft and full.     Sore nipples: no   EPDS: 9, with \"never\" marked for question on thoughts of self-harm     Assessment of infant: 37.65% Weight for age percentile   Age today: 8 weeks tomorrow  Today's weight: 10 # 7.4 oz    Amount of milk transferred from LEFT " side: 5.6 oz  Amount of milk transferred from RIGHT side: 1 oz    Baby has full flexion of arms and legs, normal tone, behavior is alert and active, respirations are normal, skin is normal, hydration is normal, jaw is normal size and alignment, palate is normal, frenulum is normal, baby can lateralize tongue, has adequate tongue lift, and tongue can protrude past bottom gum line. Upper labial frenulum is normal.    Suck exam:  Baby has strong, coordinated suck with good tongue cupping    Baby thrush: none    Jaundice: none      Feeding assessment: Baby can hold suction with tongue while at the breast, although she did come on and off frequently and require relatching.   Baby fed well but was very fussy and restless after feeding.    Alignment: The baby was flex relaxed. Baby's head was aligned with its trunk. Baby did face mother. Baby was in cross cradle/cradle position today.   Areolar Grasp: Baby was able to open mouth widely. Baby's lips were not pursed. Baby's lips did flange outward. Tongue was visible over bottom gum. Baby had complete seal.     Areolar Compression: Baby made rhythmic motion. There were no clicking or smacking sounds. There was no severe nipple discomfort. Nipples appeared rounded after feeding.  Audible swallowing: Baby made quiet sounds of swallowing: There was an increase in frequency after milk ejection reflex. The milk ejection reflex is strong and milk supply is ample.     /83   Pulse 88   Temp 98.4  F (36.9  C)   Resp 16   LMP 2024 (Within Days)   Breastfeeding Yes   OB History    Para Term  AB Living   1 1 1 0 0 1   SAB IAB Ectopic Multiple Live Births   0 0 0 0 1      # Outcome Date GA Lbr Jose/2nd Weight Sex Type Anes PTL Lv   1 Term 24 39w3d  2.835 kg (6 lb 4 oz) F CS-LTranv EPI N SHAWN      Name: Andra Farfan      Apgar1: 3  Apgar5: 6       Current Outpatient Medications:     Ascorbic Acid (VITAMIN C) 500 MG CAPS, , Disp: , Rfl:      cephALEXin (KEFLEX) 500 MG capsule, Take 1 capsule (500 mg) by mouth 2 times daily for 7 days, Disp: 14 capsule, Rfl: 0    ferrous sulfate (FEROSUL) 325 (65 Fe) MG tablet, Take 1 tablet (325 mg) by mouth daily (with breakfast) for 60 days, Disp: 60 tablet, Rfl: 0    fluticasone (FLONASE) 50 MCG/ACT nasal spray, Spray 1 spray into both nostrils daily, Disp: , Rfl:     loratadine (CLARITIN) 10 MG tablet, Take 10 mg by mouth daily, Disp: , Rfl:     norethindrone (MICRONOR) 0.35 MG tablet, Take 1 tablet (0.35 mg) by mouth daily, Disp: 90 tablet, Rfl: 3    Prenatal Vit-DSS-Fe Cbn-FA (PRENATAL AD PO), , Disp: , Rfl:     propranolol (INDERAL) 10 MG tablet, Take 10 mg by mouth daily, Disp: , Rfl:     sertraline (ZOLOFT) 50 MG tablet, , Disp: , Rfl:     Vitamin D3 (CHOLECALCIFEROL) 25 mcg (1000 units) tablet, Take by mouth daily, Disp: , Rfl:   No past medical history on file.  Past Surgical History:   Procedure Laterality Date     SECTION N/A 2024    Procedure:  section;  Surgeon: Trisha Key MD;  Location:  L+D    TONSILLECTOMY ADULT       Family History   Problem Relation Age of Onset    Leukemia Mother 41    Diabetes Type 2  Paternal Grandmother     Diabetes Type 2  Paternal Grandfather     No Known Problems Brother     No Known Problems Brother     No Known Problems Brother     No Known Problems Sister     Diabetes Type 2  Paternal Aunt     Diabetes Paternal Uncle     Diabetes Type 2  Paternal Uncle        Time spent:  Chart review/precharting: 10 min prior to day of service  Face-to-face visit:   59 min   Documentation:  17 min   Total time spent on day of service: 76 min    Yamileth Serrano, AIDEN, CNM, IBCLC

## 2024-08-07 ENCOUNTER — OFFICE VISIT (OUTPATIENT)
Dept: OBGYN | Facility: CLINIC | Age: 29
End: 2024-08-07
Payer: COMMERCIAL

## 2024-08-07 VITALS
HEART RATE: 88 BPM | SYSTOLIC BLOOD PRESSURE: 124 MMHG | DIASTOLIC BLOOD PRESSURE: 83 MMHG | TEMPERATURE: 98.4 F | RESPIRATION RATE: 16 BRPM

## 2024-08-07 DIAGNOSIS — O92.79 OTHER DISORDERS OF LACTATION: Primary | ICD-10-CM

## 2024-08-07 PROCEDURE — 99215 OFFICE O/P EST HI 40 MIN: CPT | Performed by: ADVANCED PRACTICE MIDWIFE

## 2024-08-07 PROCEDURE — 99417 PROLNG OP E/M EACH 15 MIN: CPT | Performed by: ADVANCED PRACTICE MIDWIFE

## 2024-08-07 ASSESSMENT — EDINBURGH POSTNATAL DEPRESSION SCALE (EPDS)
I HAVE BEEN SO UNHAPPY THAT I HAVE HAD DIFFICULTY SLEEPING: NOT VERY OFTEN
I HAVE BEEN SO UNHAPPY THAT I HAVE BEEN CRYING: ONLY OCCASIONALLY
TOTAL SCORE: 9
I HAVE BEEN ANXIOUS OR WORRIED FOR NO GOOD REASON: YES, SOMETIMES
I HAVE LOOKED FORWARD WITH ENJOYMENT TO THINGS: AS MUCH AS I EVER DID
I HAVE FELT SAD OR MISERABLE: NO, NOT AT ALL
THE THOUGHT OF HARMING MYSELF HAS OCCURRED TO ME: NEVER
I HAVE FELT SCARED OR PANICKY FOR NO GOOD REASON: YES, SOMETIMES
THINGS HAVE BEEN GETTING ON TOP OF ME: NO, MOST OF THE TIME I HAVE COPED QUITE WELL
I HAVE BLAMED MYSELF UNNECESSARILY WHEN THINGS WENT WRONG: YES, SOME OF THE TIME
I HAVE BEEN ABLE TO LAUGH AND SEE THE FUNNY SIDE OF THINGS: AS MUCH AS I ALWAYS COULD

## 2024-08-07 NOTE — PATIENT INSTRUCTIONS
"  Use good positioning for deep latch, with baby held close to body and baby's head/shoulders/hips in good alignment.  When in a seated position, use a pillow to help bring baby close to breasts, and stepstool to elevate your knees above hips.    When bringing your baby to your breast, compress your breast vertically and in line with baby's mouth--this will help them to get a larger mouthful of breast and a deeper latch. Babies latch best to the breast by bringing their chin in first, so point your nipple towards baby's nose, tuck the chin in close, and then wait for her mouth to open.  When her mouth opens, bring her head in deeply.  Baby's chin should be snugged deeply in your breast, their upper cheeks should be touching the breast, and their nose just out of the breast.   If there is pinching or pain, try using a finger to give a little gentle pressure on her chin to help her open more widely and take in more of your breast.  If it is still painful, use a finger to break the suction, remove baby from the breast and try again until there is no pain with nursing.  There is sometimes a little pain when the baby first begins sucking, but after the first few seconds there should be no pain--only a tugging feeling.   Continue to feed Andra on cue, 8-12 times each day, offering your breast as often as you are able or would .  When you nurse, feed on one side until baby finishes swallowing.  Once swallowing slows, use breast compression to encourage more swallowing, but once there is no more active swallowing, and baby is either sleeping, coming off the breast, or just \"nibbling,\" it is OK to use a finger to take baby off the breast and move to the other breast.  Do the same on the other side.  Offer both breasts at each feeding.  It is more important to watch the baby than the clock!    Andra is gaining weight perfectly, and she was able to transfer milk very well from your breast today.  If you would like to give " "her your breast more regularly and pump less often, you can certainly do that.  Once babies are about a month old, they need about 25-30 oz/day (or 3- 4 oz each feeding if they eat about 8 times/day) and this where their needs stay for their entire first year;  you don't need to keep producing more and more milk as they grow.    You do not need to worry about whether or not your breasts are full before nursing;  Milk production is constant.  You do not need to wait until they are \"full\" again before nursing or pumping, because there is always milk in the breast.     Follow up with lactation as needed, and pediatric provider as planned.  [employer] can be used for brief questions, but it's important to know that messages are not seen Friday through Sunday. If urgent help is needed, Monday through Friday you can call [phone] and one of our lactation consultants will get the message and respond; if you need a rapid response over a weekend or holiday, it is best to call your on-call maternity or pediatric provider.  Please feel free to schedule a return visit if the concern is more detailed;  telephone visits are also an option if you don't feel you need to be seen in person.     ____________________________    To know if your baby is getting enough milk, the main things to rely on are:  The baby's weight (should regain birthweight at around 2 weeks, and then gain around 1 oz/day after that)  Poop.  Baby should be having yellow, runny poop by day 5, and multiple times each day  Pee:  about as many wet diapers as they are days old for the first 5-6 days, and then continue with at least 5-6 wet diapers/day.    Whether your baby takes milk from a bottle after nursing, how firm or soft your breasts feel, how much milk comes when you pump, how often your baby feeds or how fussy your baby is are NOT good ways to assess whether or not your baby is getting enough milk from your breasts.  For a more complete explanation, see " these two excellent articles by ANN Childers:      Reliable Signs that Your Baby Is Getting Enough Milk  https://www.[employer].au/baby/signs-baby-is-getting-enough-milk/      Signs NOT to rely on for estimating how much milk your baby is getting  https://www.[employer].au/breastfeeding/5-unreliable-signs-that-your-baby-lt-kbndkoi-intcir-milk/     _______________    Good breastfeeding resources:    Websites:  www.[employer]  www.[employer]/blog/  www.[employer]i.org/breastfeeding-info/    Instagram:    [email or web address]  @[facility]    Books:   The Womanly Art of Breastfeeding by [employer]  Breastfeeding Doesn't Need to Suck, by Marilyn Macdonald      For help with using baby carriers:  https://babywearingtwincities.org/

## 2024-08-10 ENCOUNTER — OFFICE VISIT (OUTPATIENT)
Dept: URGENT CARE | Facility: URGENT CARE | Age: 29
End: 2024-08-10
Payer: COMMERCIAL

## 2024-08-10 VITALS
BODY MASS INDEX: 38.45 KG/M2 | DIASTOLIC BLOOD PRESSURE: 87 MMHG | WEIGHT: 245 LBS | HEART RATE: 73 BPM | OXYGEN SATURATION: 97 % | TEMPERATURE: 97.8 F | SYSTOLIC BLOOD PRESSURE: 128 MMHG | HEIGHT: 67 IN

## 2024-08-10 DIAGNOSIS — H60.392 INFECTIVE OTITIS EXTERNA, LEFT: Primary | ICD-10-CM

## 2024-08-10 PROCEDURE — 99213 OFFICE O/P EST LOW 20 MIN: CPT | Performed by: PHYSICIAN ASSISTANT

## 2024-08-10 RX ORDER — NEOMYCIN SULFATE, POLYMYXIN B SULFATE AND HYDROCORTISONE 10; 3.5; 1 MG/ML; MG/ML; [USP'U]/ML
3 SUSPENSION/ DROPS AURICULAR (OTIC) 4 TIMES DAILY
Qty: 10 ML | Refills: 0 | Status: SHIPPED | OUTPATIENT
Start: 2024-08-10 | End: 2024-08-17

## 2024-08-10 NOTE — PROGRESS NOTES
Infective otitis externa, left  - neomycin-polymyxin-hydrocortisone (CORTISPORIN) 3.5-09053-1 otic suspension; Place 3 drops Into the left ear 4 times daily for 7 days       Swimmer's Ear: Care Instructions  Overview     Swimmer's ear (otitis externa) is inflammation or infection of the ear canal. This is the passage that leads from the outer ear to the eardrum. Any water, sand, or other debris that gets into the ear canal and stays there can cause swimmer's ear. Putting cotton swabs or other items in the ear to clean it can also cause this problem.  Swimmer's ear can be very painful. But you can treat the pain and infection with medicines. You should feel better in a few days.  Follow-up care is a key part of your treatment and safety. Be sure to make and go to all appointments, and call your doctor if you are having problems. It's also a good idea to know your test results and keep a list of the medicines you take.  How can you care for yourself at home?  Cleaning and care  Use antibiotic drops as your doctor directs.  Do not insert eardrops (other than the antibiotic eardrops) or anything else into the ear unless your doctor has told you to.  Avoid getting water in the ear until the problem clears up. Use cotton lightly coated with petroleum jelly as an earplug. Do not use plastic earplugs.  Use a hair dryer set on low to carefully dry the ear after you shower.  To ease ear pain, hold a warm washcloth against your ear.  Take pain medicines exactly as directed.  If the doctor gave you a prescription medicine for pain, take it as prescribed.  If you are not taking a prescription pain medicine, ask your doctor if you can take an over-the-counter medicine.  Inserting eardrops  Warm the drops to body temperature by rolling the container in your hands. Or you can place it in a cup of warm water for a few minutes.  Lie down, with your ear facing up.  Place drops inside the ear. Follow your doctor's instructions (or  "the directions on the label) for how many drops to use. Gently wiggle the outer ear or pull the ear up and back to help the drops get into the ear.  It's important to keep the liquid in the ear canal for 3 to 5 minutes.  When should you call for help?   Call your doctor now or seek immediate medical care if:    You have a new or higher fever.     You have new or worse pain, swelling, warmth, or redness around or behind your ear.     You have new or increasing pus or blood draining from your ear.   Watch closely for changes in your health, and be sure to contact your doctor if:    You are not getting better after 2 days (48 hours).   Where can you learn more?  Go to [email or web address]://[email or web address].net/patiented  Enter C706 in the search box to learn more about \"Swimmer's Ear: Care Instructions.\"  Current as of: September 27, 2023               Content Version: 14.0    9054-3474 "[employer]   Care instructions adapted under license by your healthcare professional. If you have questions about a medical condition or this instruction, always ask your healthcare professional. "[employer] disclaims any warranty or liability for your use of this information.             Patient was advised to return to clinic for reevaluation (either UC or PCP) if symptoms do not improve in 7 days. Patient educated on red flag symptoms and asked to go directly to the ED if these symptoms present themselves.       CATIE [facility] URGENT [facility]ARE    Subjective   28 year old who presents to clinic today for the following health issues:    Urgent Care and Otalgia       HPI     Acute Illness  Acute illness concerns: Pt in clinic to have eval for left ear pain and feeling plugged.  Onset/Duration: Yesterday  Symptoms:  Fever: No  Chills/Sweats: No  Headache (location?): No  Sinus Pressure: No  Conjunctivitis:  No  Ear Pain: YES: left- Pain going down the left jaw - Some crackling noises. No tinnitus- Denies " ear discharge   Rhinorrhea: YE- Attributed to seasonal allergy   Congestion: YES- Attributed to allergies   Sore Throat: No  Cough: no  Wheeze: No  Decreased Appetite: No  Nausea: No  Vomiting: No  Diarrhea: No  Dysuria/Freq.: No  Dysuria or Hematuria: No  Fatigue/Achiness: No  Sick/Strep Exposure: No  Therapies tried and outcome: None    Patient uses ear buds and q-tips.     Review of Systems   Review of Systems   See HPI    Objective    Temp: 97.8  F (36.6  C) Temp src: Oral BP: 128/87 Pulse: 73     SpO2: 97 %       Physical Exam   Physical Exam  Constitutional:       General: She is not in acute distress.     Appearance: Normal appearance. She is normal weight. She is not ill-appearing, toxic-appearing or diaphoretic.   HENT:      Head: Normocephalic and atraumatic.      Right Ear: Tympanic membrane, ear canal and external ear normal. There is no impacted cerumen.      Left Ear: Tympanic membrane, ear canal and external ear normal. Drainage, swelling and tenderness present. There is no impacted cerumen.   Cardiovascular:      Rate and Rhythm: Normal rate and regular rhythm.      Pulses: Normal pulses.      Heart sounds: Normal heart sounds. No murmur heard.     No friction rub. No gallop.   Pulmonary:      Effort: Pulmonary effort is normal. No respiratory distress.      Breath sounds: No stridor. No wheezing, rhonchi or rales.   Chest:      Chest wall: No tenderness.   Neurological:      General: No focal deficit present.      Mental Status: She is alert and oriented to person, place, and time. Mental status is at baseline.      Gait: Gait normal.   Psychiatric:         Mood and Affect: Mood normal.         Behavior: Behavior normal.         Thought Content: Thought content normal.         Judgment: Judgment normal.          No results found for this or any previous visit (from the past 24 hour(s)).

## 2024-09-04 ENCOUNTER — THERAPY VISIT (OUTPATIENT)
Dept: PHYSICAL THERAPY | Facility: CLINIC | Age: 29
End: 2024-09-04
Attending: ADVANCED PRACTICE MIDWIFE
Payer: COMMERCIAL

## 2024-09-04 DIAGNOSIS — N39.41 URGE INCONTINENCE OF URINE: Primary | ICD-10-CM

## 2024-09-04 DIAGNOSIS — N81.89 PELVIC FLOOR WEAKNESS: ICD-10-CM

## 2024-09-04 PROCEDURE — 97110 THERAPEUTIC EXERCISES: CPT | Mod: GP | Performed by: PHYSICAL THERAPIST

## 2024-09-04 PROCEDURE — 97161 PT EVAL LOW COMPLEX 20 MIN: CPT | Mod: GP | Performed by: PHYSICAL THERAPIST

## 2024-09-04 PROCEDURE — 97530 THERAPEUTIC ACTIVITIES: CPT | Mod: GP | Performed by: PHYSICAL THERAPIST

## 2024-09-04 NOTE — PROGRESS NOTES
PHYSICAL THERAPY EVALUATION  Type of Visit: Evaluation       Fall Risk Screen:  Fall screen completed by: PT  Have you fallen 2 or more times in the past year?: No  Have you fallen and had an injury in the past year?: No  Is patient a fall risk?: No    Subjective       Presenting condition or subjective complaint:  Pt delivered via  on 2024.  Referred to PT for pelvic floor therapy.  Pt complaining of urinary urgency and urge incontinence.  She will have sudden urge to urinate and will dribble or leak and have to run to the bathroom.  Denies having these symptoms prior to pregnancy or during pregnancy.  This is all since delivery.  This is a daily occurrence and happens 1 time a day.  Pt reports that she was laboring, dilated to 5 cm, was given ptocin but baby heart rate was dropping so decided to have .    Date of onset: 24    Relevant medical history: Depression; High blood pressure; Incontinence; Overweight; Pregnant or breastfeeding No past medical history on file.    Dates & types of surgery: C section 24    Prior diagnostic imaging/testing results:     Urine culture   Prior therapy history for the same diagnosis, illness or injury: No      Prior Level of Function  Transfers:   Ambulation:   ADL:   IADL:     Living Environment  Social support: With a significant other or spouse   Type of home: House; 2-story   Stairs to enter the home: No       Ramp: Yes   Stairs inside the home: Yes 15 Is there a railing: Yes     Help at home: None  Equipment owned:       Employment: Yes IT tech, returns to work in 2 weeks  Hobbies/Interests: Gardening and painting    Patient goals for therapy: Hold my bladder, not leak    Pain assessment: Pain denied     Objective      PELVIC EVALUATION  ADDITIONAL HISTORY:  Sex assigned at birth: Female  Gender identity: Female    Pronouns: She/Her Hers      Bladder History:  Feels bladder filling:  yes - feels urges to urinate  Triggers for feeling of  inability to wait to go to the bathroom: No    How long can you wait to urinate: 4 hours  Gets up at night to urinate: Yes twice - prior did not wake up prior to pregnancy  Can stop the flow of urine when urinating: No  Volume of urine usually released: Medium   Other issues:  denies dribbling, denies need to strain, feels she fully empties her bladder.  Has had UTI's with pregnancy and after  Number of bladder infections in last 12 months: 4 - 2 during pregnancy, 2 after delivery.  Most recent UTI was about 2024.  Did get treated with antibiotics and cleared up.    Fluid intake per day: 6 glasses of water a day  2 cans of soda a day   Medications taken for bladder: No     Activities causing urine leak: Sneeze; Hurrying to the bathroom due to a strong urge to urinate (pee)    Amount of urine typically leaked: a few drops when I can stop the flow  Pads used to help with leaking: No        Bowel History:  Frequency of bowel movement: 1 every 2 days  Consistency of stool: Hard  type 2-3  Ignores the urge to defecate: No  Other bowel issues: Pain when pooping; Straining to have bowel movement  Length of time spent trying to have a bowel movement:      Sexual Function History:  Sexual orientation: Bisexual    Sexually active: Yes,   Lubrication used: No No  Pelvic pain: Deep penetration (rectal or vaginal) this is new since delivery   Pain or difficulty with orgasms/erection/ejaculation: No    State of menopause: Perimenopause (have not gone through menopause yet)  Hormone medications: No      Are you currently pregnant: No  Number of previous pregnancies: 1  Number of deliveries: 1  If you have delivered before, did you have any of these issues during delivery:  delivery  Have you been diagnosed with pelvic prolapse or abdominal separation: No  Do you get regular exercise: Yes  Have you tried pelvic floor strengthening exercises for 4 weeks: No  Do you have any history of trauma that is relevant to your  care that you d like to share: No      Discussed reason for referral regarding pelvic health needs and external/internal pelvic floor muscle examination with patient/guardian.  Opportunity provided to ask questions and verbal consent for assessment and intervention was given.    PAIN:   POSTURE: WNL  LUMBAR SCREEN: AROM WNL  HIP SCREEN:  Strength: WNL   Functional Strength Testing: Double Leg Squat: Good technique/no significant findings  Single Leg Squat: Good technique/no significant findings    PELVIC/SI SCREEN:  WNL   PAIN PROVOCATION TEST:   PELVIS/SI SPECIAL TESTS:   BREATHING SYMMETRY: Decreased rib cage mobility    PELVIC EXAM  External Visual Inspection:  At rest: Normal  With voluntary pelvic floor contraction: Perineal elevation, Present  Relaxation of PFM: Yes  With intra-abdominal pressure: Cough: Perineal elevation  Bearing down as defecation: Perineal descent    Integumentary:   Introitus: Unremarkable    External Digital Palpation per Perineum:   Ischiocavernosis: Unremarkable  Bulbo cavernosis: Unremarkable  Transverse perineal: Unremarkable  Levator ani: Unremarkable  Perineal body: min tenderness    Scar:   Location/Type:  scar good mobility  Mobility:     Internal Digital Palpation:  Per Vagina:  Tenderness  Myofascial Resistance to Palpation: Taut  Digital Muscle Performance: P (Power): 3/5  E (Endurance): 10 sec  F (Fast Twitch): 8 reps  Compensations: Abdominals  Relaxation Post-Contraction: Normal    Per Rectum:        Pelvic Organ Prolapse:       ABDOMINAL ASSESSMENT  Diastasis Rectus Abdominis (AGUSTO):  AGUSTO presence: No    Abdominal Activation/Strength:     Scar:   Location/Type:   Mobility:     Fascial Tension/Restriction:     BIOFEEDBACK:  Position:   Surface Electrodes:     Abdominals:     Perianals:       DERMATOMES:   DTR S:     Assessment & Plan   CLINICAL IMPRESSIONS  Medical Diagnosis: urge incontinence    Treatment Diagnosis: pelvic floor weakness   Impression/Assessment:  Patient is a 28 year old female with urge incontinence complaints.  The following significant findings have been identified: Decreased ROM/flexibility, Decreased strength, and Impaired muscle performance. These impairments interfere with their ability to perform self care tasks and community mobility as compared to previous level of function.     Clinical Decision Making (Complexity):  Clinical Presentation: Stable/Uncomplicated  Clinical Presentation Rationale: based on medical and personal factors listed in PT evaluation  Clinical Decision Making (Complexity): Low complexity    PLAN OF CARE  Treatment Interventions:  Interventions: Manual Therapy, Neuromuscular Re-education, Therapeutic Activity, Therapeutic Exercise, Self-Care/Home Management    Long Term Goals     PT Goal 1  Goal Identifier: quick flicks  Goal Description: Pt will improve PFM quick flicks x 10 reps without abdominal compensation  Rationale: to maximize safety and independence with performance of ADLs and functional tasks;to maximize safety and independence with self cares (decrease urge incontinence to 0 episodes in 1 month)  Goal Progress: daily urge incontinence episodes  Target Date: 11/03/24      Frequency of Treatment: 1 x a week  Duration of Treatment: 3 weeks tapering to 2 times a month x 2 months    Recommended Referrals to Other Professionals: Physical Therapy  Education Assessment:        Risks and benefits of evaluation/treatment have been explained.   Patient/Family/caregiver agrees with Plan of Care.     Evaluation Time:     PT Eval, Low Complexity Minutes (28460): 20       Signing Clinician: Leonardo Guzman PT

## 2024-09-13 ENCOUNTER — THERAPY VISIT (OUTPATIENT)
Dept: PHYSICAL THERAPY | Facility: CLINIC | Age: 29
End: 2024-09-13
Attending: ADVANCED PRACTICE MIDWIFE
Payer: COMMERCIAL

## 2024-09-13 DIAGNOSIS — N81.89 PELVIC FLOOR WEAKNESS: Primary | ICD-10-CM

## 2024-09-13 DIAGNOSIS — N39.41 URGE INCONTINENCE OF URINE: ICD-10-CM

## 2024-09-13 PROCEDURE — 97110 THERAPEUTIC EXERCISES: CPT | Mod: GP | Performed by: PHYSICAL THERAPIST

## 2024-09-13 PROCEDURE — 97530 THERAPEUTIC ACTIVITIES: CPT | Mod: GP | Performed by: PHYSICAL THERAPIST

## 2024-09-13 PROCEDURE — 97112 NEUROMUSCULAR REEDUCATION: CPT | Mod: GP | Performed by: PHYSICAL THERAPIST

## 2024-09-20 ENCOUNTER — THERAPY VISIT (OUTPATIENT)
Dept: PHYSICAL THERAPY | Facility: CLINIC | Age: 29
End: 2024-09-20
Payer: COMMERCIAL

## 2024-09-20 DIAGNOSIS — N81.89 PELVIC FLOOR WEAKNESS: Primary | ICD-10-CM

## 2024-09-20 DIAGNOSIS — N39.41 URGE INCONTINENCE OF URINE: ICD-10-CM

## 2024-09-20 PROCEDURE — 97110 THERAPEUTIC EXERCISES: CPT | Mod: GP | Performed by: PHYSICAL THERAPIST

## 2024-09-20 PROCEDURE — 97112 NEUROMUSCULAR REEDUCATION: CPT | Mod: GP | Performed by: PHYSICAL THERAPIST

## 2025-03-02 ENCOUNTER — E-VISIT (OUTPATIENT)
Dept: MIDWIFE SERVICES | Facility: CLINIC | Age: 30
End: 2025-03-02
Payer: COMMERCIAL

## 2025-03-02 DIAGNOSIS — R69 DIAGNOSIS UNKNOWN: Primary | ICD-10-CM

## 2025-03-02 PROCEDURE — 99207 PR NON-BILLABLE SERV PER CHARTING: CPT | Performed by: ADVANCED PRACTICE MIDWIFE

## 2025-03-02 ASSESSMENT — ANXIETY QUESTIONNAIRES
IF YOU CHECKED OFF ANY PROBLEMS ON THIS QUESTIONNAIRE, HOW DIFFICULT HAVE THESE PROBLEMS MADE IT FOR YOU TO DO YOUR WORK, TAKE CARE OF THINGS AT HOME, OR GET ALONG WITH OTHER PEOPLE: VERY DIFFICULT
3. WORRYING TOO MUCH ABOUT DIFFERENT THINGS: MORE THAN HALF THE DAYS
1. FEELING NERVOUS, ANXIOUS, OR ON EDGE: MORE THAN HALF THE DAYS
GAD7 TOTAL SCORE: 11
5. BEING SO RESTLESS THAT IT IS HARD TO SIT STILL: NOT AT ALL
7. FEELING AFRAID AS IF SOMETHING AWFUL MIGHT HAPPEN: SEVERAL DAYS
GAD7 TOTAL SCORE: 11
GAD7 TOTAL SCORE: 11
4. TROUBLE RELAXING: NEARLY EVERY DAY
6. BECOMING EASILY ANNOYED OR IRRITABLE: SEVERAL DAYS
8. IF YOU CHECKED OFF ANY PROBLEMS, HOW DIFFICULT HAVE THESE MADE IT FOR YOU TO DO YOUR WORK, TAKE CARE OF THINGS AT HOME, OR GET ALONG WITH OTHER PEOPLE?: VERY DIFFICULT
2. NOT BEING ABLE TO STOP OR CONTROL WORRYING: MORE THAN HALF THE DAYS
7. FEELING AFRAID AS IF SOMETHING AWFUL MIGHT HAPPEN: SEVERAL DAYS

## 2025-03-02 ASSESSMENT — PATIENT HEALTH QUESTIONNAIRE - PHQ9
SUM OF ALL RESPONSES TO PHQ QUESTIONS 1-9: 16
10. IF YOU CHECKED OFF ANY PROBLEMS, HOW DIFFICULT HAVE THESE PROBLEMS MADE IT FOR YOU TO DO YOUR WORK, TAKE CARE OF THINGS AT HOME, OR GET ALONG WITH OTHER PEOPLE: VERY DIFFICULT
SUM OF ALL RESPONSES TO PHQ QUESTIONS 1-9: 16

## 2025-03-02 NOTE — PATIENT INSTRUCTIONS
Dear Chasity,    It is a little unclear as to why the e-visit was scheduled. Are you looking for a referral for testing for ADHD? I would recommend scheduling an appointment to discuss with your primary care provider. They will be able to address ADHD diagnosis and treatment as well as order a referral for Psychological testing.     I would recommend checking with your insurance as often you can schedule without a referral if it is a covered provider under your insurance. Some clinics do require a referral.     Please schedule this visit in [employer]. You will have a Schedule Now button in [employer] to help with scheduling this appointment. Otherwise, you can call [phone] to schedule an appointment.     You will not be charged for this eVisit. Thank you for trusting us with your care.     AIDEN Paz CNM

## 2025-03-02 NOTE — TELEPHONE ENCOUNTER
Provider E-Visit time total (minutes): Referred to in person/virtual visit.  Less than 5 minutes.   Alyssia Begum CNM

## 2025-03-05 ENCOUNTER — PATIENT OUTREACH (OUTPATIENT)
Dept: CARE COORDINATION | Facility: CLINIC | Age: 30
End: 2025-03-05
Payer: COMMERCIAL

## 2025-03-22 ENCOUNTER — HEALTH MAINTENANCE LETTER (OUTPATIENT)
Age: 30
End: 2025-03-22

## 2025-05-09 PROBLEM — O47.03 PRETERM UTERINE CONTRACTIONS IN THIRD TRIMESTER, ANTEPARTUM: Status: RESOLVED | Noted: 2024-05-17 | Resolved: 2025-05-09

## 2025-05-09 PROBLEM — N39.41 URGE INCONTINENCE OF URINE: Status: RESOLVED | Noted: 2024-09-04 | Resolved: 2025-05-09

## 2025-05-09 PROBLEM — O13.9 GESTATIONAL HYPERTENSION: Status: RESOLVED | Noted: 2024-06-14 | Resolved: 2025-05-09

## 2025-05-09 PROBLEM — Z23 NEED FOR TDAP VACCINATION: Status: RESOLVED | Noted: 2023-10-26 | Resolved: 2025-05-09

## 2025-05-09 PROBLEM — F33.1 MODERATE RECURRENT MAJOR DEPRESSION (H): Status: ACTIVE | Noted: 2025-05-09

## 2025-05-09 PROBLEM — O46.93 VAGINAL BLEEDING IN PREGNANCY, THIRD TRIMESTER: Status: RESOLVED | Noted: 2024-05-17 | Resolved: 2025-05-09

## 2025-05-09 PROBLEM — N81.89 PELVIC FLOOR WEAKNESS: Status: RESOLVED | Noted: 2024-09-04 | Resolved: 2025-05-09

## 2025-05-12 ENCOUNTER — PATIENT OUTREACH (OUTPATIENT)
Dept: CARE COORDINATION | Facility: CLINIC | Age: 30
End: 2025-05-12
Payer: COMMERCIAL

## (undated) DEVICE — SOL WATER IRRIG 1000ML BOTTLE 07139-09

## (undated) DEVICE — SPONGE LAP 18X18" 1515

## (undated) DEVICE — CATH TRAY FOLEY 16FR BARDEX W/DRAIN BAG STATLOCK 300316A

## (undated) DEVICE — SU VICRYL 4-0 PS-2 18" UND J496G

## (undated) DEVICE — STOCKING SLEEVE COMPRESSION CALF LG

## (undated) DEVICE — SOL NACL 0.9% IRRIG 1000ML BOTTLE 07138-09

## (undated) DEVICE — STRAP KNEE/BODY 31143004

## (undated) DEVICE — PACK C-SECTION LF PL15OTA83B

## (undated) DEVICE — GLOVE PROTEXIS BLUE W/NEU-THERA 6.5  2D73EB65

## (undated) DEVICE — GLOVE ESTEEM POWDER FREE SMT 6.5  2D72PT65

## (undated) DEVICE — DRSG STERI STRIP 1/4X3" R1541

## (undated) DEVICE — SU VICRYL 0 CT-1 36" J346H

## (undated) DEVICE — ESU PENCIL W/SMOKE EVAC NEPTUNE STRYKER 0703-046-000

## (undated) DEVICE — SOL ADH LIQUID BENZOIN SWAB 0.6ML C1544

## (undated) DEVICE — SU MONOCRYL 0 CT-1 36" Y346H

## (undated) DEVICE — PREP CHLORAPREP 26ML TINTED ORANGE  260815

## (undated) RX ORDER — DEXAMETHASONE SODIUM PHOSPHATE 4 MG/ML
INJECTION, SOLUTION INTRA-ARTICULAR; INTRALESIONAL; INTRAMUSCULAR; INTRAVENOUS; SOFT TISSUE
Status: DISPENSED
Start: 2024-06-13

## (undated) RX ORDER — AMPICILLIN 250 MG/1
INJECTION, POWDER, FOR SOLUTION INTRAMUSCULAR; INTRAVENOUS
Status: DISPENSED
Start: 2024-05-20

## (undated) RX ORDER — MORPHINE SULFATE 1 MG/ML
INJECTION, SOLUTION EPIDURAL; INTRATHECAL; INTRAVENOUS
Status: DISPENSED
Start: 2024-06-13

## (undated) RX ORDER — PENICILLIN G POTASSIUM 5000000 [IU]/1
INJECTION, POWDER, FOR SOLUTION INTRAMUSCULAR; INTRAVENOUS
Status: DISPENSED
Start: 2024-05-20

## (undated) RX ORDER — ONDANSETRON 2 MG/ML
INJECTION INTRAMUSCULAR; INTRAVENOUS
Status: DISPENSED
Start: 2024-06-13

## (undated) RX ORDER — FENTANYL CITRATE 50 UG/ML
INJECTION, SOLUTION INTRAMUSCULAR; INTRAVENOUS
Status: DISPENSED
Start: 2024-06-13

## (undated) RX ORDER — DEXMEDETOMIDINE HYDROCHLORIDE 4 UG/ML
INJECTION, SOLUTION INTRAVENOUS
Status: DISPENSED
Start: 2024-06-13

## (undated) RX ORDER — OXYTOCIN/0.9 % SODIUM CHLORIDE 30/500 ML
PLASTIC BAG, INJECTION (ML) INTRAVENOUS
Status: DISPENSED
Start: 2024-06-13